# Patient Record
Sex: FEMALE | Race: BLACK OR AFRICAN AMERICAN | NOT HISPANIC OR LATINO | Employment: OTHER | URBAN - NONMETROPOLITAN AREA
[De-identification: names, ages, dates, MRNs, and addresses within clinical notes are randomized per-mention and may not be internally consistent; named-entity substitution may affect disease eponyms.]

---

## 2023-10-06 ENCOUNTER — HOSPITAL ENCOUNTER (EMERGENCY)
Facility: HOSPITAL | Age: 88
Discharge: HOME | End: 2023-10-06
Payer: COMMERCIAL

## 2023-10-06 VITALS
RESPIRATION RATE: 16 BRPM | OXYGEN SATURATION: 96 % | DIASTOLIC BLOOD PRESSURE: 74 MMHG | HEART RATE: 62 BPM | SYSTOLIC BLOOD PRESSURE: 175 MMHG | TEMPERATURE: 99 F | HEIGHT: 59 IN | WEIGHT: 121.81 LBS | BODY MASS INDEX: 24.56 KG/M2

## 2023-10-06 DIAGNOSIS — S00.03XA HEMATOMA OF SCALP, INITIAL ENCOUNTER: ICD-10-CM

## 2023-10-06 DIAGNOSIS — S00.03XA CONTUSION OF SCALP, INITIAL ENCOUNTER: Primary | ICD-10-CM

## 2023-10-06 PROCEDURE — 99284 EMERGENCY DEPT VISIT MOD MDM: CPT | Mod: 25

## 2023-10-07 NOTE — ED PROVIDER NOTES
Encounter Date: 10/6/2023       History     Chief Complaint   Patient presents with    Fall    Head Injury     Pt. Report mechanical fall with hematoma to back of head. Pt. Denies LOC and blood thinner. Denies HA. Reports soreness to head at  hematoma.      89-year-old female presents after trip and fall at home.  She struck the back of her head and suffered a hematoma on her posterior scalp.  She denies any loss of consciousness.  She did not become weak and fall.  She denies any other injury.  Denies any neck pain.  She has suffered no neurologic symptoms or vomiting.  She is in no pain at this time.    The history is provided by the patient and a relative.     Review of patient's allergies indicates:  No Known Allergies  History reviewed. No pertinent past medical history.  No past surgical history on file.  History reviewed. No pertinent family history.     Review of Systems   Constitutional:  Negative for fever.   HENT:  Negative for sore throat.    Respiratory:  Negative for shortness of breath.    Cardiovascular:  Negative for chest pain.   Gastrointestinal:  Negative for nausea.   Genitourinary:  Negative for dysuria.   Musculoskeletal:  Negative for back pain and neck pain.   Skin:  Positive for wound. Negative for rash.   Neurological:  Negative for weakness and headaches.   Hematological:  Does not bruise/bleed easily.   All other systems reviewed and are negative.      Physical Exam     Initial Vitals [10/06/23 1933]   BP Pulse Resp Temp SpO2   (!) 185/68 68 16 98.6 °F (37 °C) 99 %      MAP       --         Physical Exam    Nursing note and vitals reviewed.  Constitutional: Vital signs are normal. She appears well-developed and well-nourished. She is cooperative.   Patient appears well, and is freely conversant   HENT:   Head: Normocephalic.   Mouth/Throat: Oropharynx is clear and moist.   Has a quarter-size hematoma on the back of her left scalp   Eyes: Conjunctivae, EOM and lids are normal. Pupils are  equal, round, and reactive to light.   Neck: Trachea normal. Neck supple.   There is no cervical tenderness.   Normal range of motion.  Cardiovascular:  Normal rate, regular rhythm, normal heart sounds and intact distal pulses.           Pulmonary/Chest: Breath sounds normal.   Abdominal: Abdomen is soft. Bowel sounds are normal.   Musculoskeletal:         General: Normal range of motion.      Cervical back: Normal, normal range of motion and neck supple.      Lumbar back: Normal.     Neurological: She is alert and oriented to person, place, and time. She has normal strength. Coordination normal. GCS score is 15. GCS eye subscore is 4. GCS verbal subscore is 5. GCS motor subscore is 6.   Skin: Skin is warm, dry and intact. Capillary refill takes less than 2 seconds.   Psychiatric: She has a normal mood and affect. Her speech is normal and behavior is normal. Judgment and thought content normal. Cognition and memory are normal.         ED Course   Procedures  Labs Reviewed - No data to display       Imaging Results              CT Head Without Contrast (Preliminary result)  Result time 10/06/23 20:03:18      Preliminary result by Grover Springer MD (10/06/23 20:03:18)                   Narrative:    START OF REPORT:  Technique: CT of the head was performed without intravenous contrast with axial as well as coronal and sagittal images.    Comparison: None.    Dosage Information: Automated exposure control was utilized.    Clinical history: Pt. Report mechanical fall with hematoma to back of head. Pt. Denies LOC and blood thinner. Denies HA. Reports soreness to head at hematoma.    Findings:  Hemorrhage: No acute intracranial hemorrhage is seen.  CSF spaces: The ventricles, sulci and basal cisterns all appear prominent consistent with global cerebral atrophy.  Brain parenchyma: There is preservation of the grey white junction throughout. No acute infarct. A chronic infarct is identified in the right basal ganglia seen  on image 15, series 6.  Cerebellum: Unremarkable.  Vascular: Atheromatous calcification of the intracranial arteries is seen.  Sella and skull base: The sella appears somewhat prominent and CSF filled.  Intracranial calcifications: Incidental note is made of bilateral choroid plexus calcification.  Calvarium: No acute linear or depressed skull fracture is seen.    Maxillofacial Structures:  Paranasal sinuses: The visualized paranasal sinuses appear clear with no significant mucoperiosteal thickening or air fluid levels identified.  Orbits: The orbits appear unremarkable.  Zygomatic arches: The zygomatic arches are intact and unremarkable.  Temporal bones and mastoids: The temporal bones and mastoids appear unremarkable.  TMJ: The mandibular condyles appear normally placed with respect to the mandibular fossa.  Nasal Bones: The nasal septum is midline. No displaced nasal bone fracture is seen.    Visualized upper cervical spine: The visualized cervical spine appears unremarkable.      Impression:  1. No acute intracranial process identified. Details as above.                                         Medications - No data to display  Medical Decision Making  Left occipital scalp contusion/hematoma  Differential diagnosis:  Intracerebral hemorrhage, concussion, scalp contusion/hematoma  CT brain    Amount and/or Complexity of Data Reviewed  Radiology: ordered. Decision-making details documented in ED Course.               ED Course as of 10/06/23 2029   Fri Oct 06, 2023   2027 CT Head Without Contrast  No acute abnormalities, no bleed [TM]      ED Course User Index  [TM] Pj Barclay MD                    Clinical Impression:   Final diagnoses:  [S00.03XA] Contusion of scalp, initial encounter (Primary)  [S00.03XA] Hematoma of scalp, initial encounter        ED Disposition Condition    Discharge Stable          ED Prescriptions    None       Follow-up Information       Follow up With Specialties Details Why  Contact Info    PCP  Call in 3 days               Pj Barclay MD  10/06/23 2029

## 2024-03-04 ENCOUNTER — HOSPITAL ENCOUNTER (EMERGENCY)
Facility: HOSPITAL | Age: 89
Discharge: HOME | End: 2024-03-04
Payer: COMMERCIAL

## 2024-03-04 VITALS
WEIGHT: 130 LBS | HEART RATE: 86 BPM | OXYGEN SATURATION: 98 % | RESPIRATION RATE: 17 BRPM | HEIGHT: 59 IN | TEMPERATURE: 98 F | BODY MASS INDEX: 26.21 KG/M2 | DIASTOLIC BLOOD PRESSURE: 82 MMHG | SYSTOLIC BLOOD PRESSURE: 195 MMHG

## 2024-03-04 DIAGNOSIS — M79.602 ARM PAIN, LATERAL, LEFT: Primary | ICD-10-CM
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
## 2024-03-04 PROCEDURE — 99283 EMERGENCY DEPT VISIT LOW MDM: CPT | Mod: 25

## 2024-03-04 PROCEDURE — 25000003 PHARM REV CODE 250: Performed by: NURSE PRACTITIONER

## 2024-03-04 RX ORDER — ACETAMINOPHEN AND CODEINE PHOSPHATE 300; 30 MG/1; MG/1
1 TABLET ORAL
Status: COMPLETED | OUTPATIENT
Start: 2024-03-04 | End: 2024-03-04

## 2024-03-04 RX ADMIN — ACETAMINOPHEN AND CODEINE PHOSPHATE 1 TABLET: 300; 30 TABLET ORAL at 05:03

## 2024-03-04 NOTE — ED PROVIDER NOTES
"Encounter Date: 3/4/2024       History     Chief Complaint   Patient presents with    Fall     Reports that she got out of the truck and fell onto her left side. Pt reports that her head "felt heavy" and just fell over. Denies hitting head or LOC. Negative for thinners. Having pain in her entire left arm. Denies blurry vision, weakness, or headache.     C/o lt arm pain after falling out of the truck PTA, she denies hitting her head, no loc, she says she was not dizzy and does not know how she fell out of the truck      Review of patient's allergies indicates:  No Known Allergies  Past Medical History:   Diagnosis Date    Arthritis     Diabetes mellitus     Hypertension      Past Surgical History:   Procedure Laterality Date    EYE SURGERY      HYSTERECTOMY      TONSILLECTOMY       No family history on file.  Social History     Tobacco Use    Smoking status: Never    Smokeless tobacco: Never   Substance Use Topics    Alcohol use: Never     Review of Systems   Musculoskeletal:         Lt arm pain   All other systems reviewed and are negative.      Physical Exam     Initial Vitals [03/04/24 1719]   BP Pulse Resp Temp SpO2   (!) 195/82 86 16 98.2 °F (36.8 °C) 98 %      MAP       --         Physical Exam    Nursing note and vitals reviewed.  Constitutional: She appears well-developed and well-nourished.   HENT:   Head: Normocephalic and atraumatic.   Eyes: Pupils are equal, round, and reactive to light.   Neck:   Normal range of motion.  Cardiovascular:  Normal rate, regular rhythm and normal heart sounds.           Pulmonary/Chest: Breath sounds normal.   Musculoskeletal:      Cervical back: Normal range of motion.      Comments: Lt humerus tenderness and limited rom, all other extremities wnl     Neurological: She is alert and oriented to person, place, and time.   Skin: Skin is warm and dry. Capillary refill takes less than 2 seconds.   Psychiatric: She has a normal mood and affect. Her behavior is normal. Judgment " and thought content normal.         ED Course   Procedures  Labs Reviewed - No data to display       Imaging Results              X-Ray Humerus 2 View Left (Final result)  Result time 03/04/24 17:40:32      Final result by Jl Ugarte MD (03/04/24 17:40:32)                   Impression:      No acute finding is identified.      Electronically signed by: Jl Ugarte  Date:    03/04/2024  Time:    17:40               Narrative:    EXAMINATION:  XR HUMERUS 2 VIEW LEFT    CLINICAL HISTORY:  Fall (on) (from) unspecified stairs and steps, sequela    COMPARISON:  None    FINDINGS:  AP and lateral projections of the right humerus show no evidence of an acute displaced fracture, dislocation or radiopaque foreign body.                        Wet Read by Pj Barclay MD (03/04/24 17:36:05, [facility], Emergency Medicine)    No fractures, normal alignment                                     Medications   acetaminophen-codeine 300-30mg per tablet 1 tablet (1 tablet Oral Given 3/4/24 1739)     Medical Decision Making  Problems Addressed:  Arm pain, lateral, left: acute illness or injury  Fall (on) (from) unspecified stairs and steps, sequela: acute illness or injury    Amount and/or Complexity of Data Reviewed  Radiology: ordered and independent interpretation performed.    Risk  Prescription drug management.                                      Clinical Impression:  Final diagnoses:  [W10.9XXS] Fall (on) (from) unspecified stairs and steps, sequela  [M79.602] Arm pain, lateral, left (Primary)          ED Disposition Condition    Discharge Stable          ED Prescriptions    None       Follow-up Information       Follow up With Specialties Details Why Contact Info    Capo Kelly MD Family Medicine Call  As needed [phone]  [address]  [facility] [address]  [phone]               Afia Ayala [facility]  03/04/24 8031

## 2024-03-24 ENCOUNTER — HOSPITAL ENCOUNTER (EMERGENCY)
Facility: HOSPITAL | Age: 89
Discharge: HOME | End: 2024-03-24
Attending: INTERNAL MEDICINE
Payer: COMMERCIAL

## 2024-03-24 VITALS
WEIGHT: 114 LBS | BODY MASS INDEX: 22.98 KG/M2 | DIASTOLIC BLOOD PRESSURE: 91 MMHG | HEART RATE: 87 BPM | RESPIRATION RATE: 18 BRPM | SYSTOLIC BLOOD PRESSURE: 163 MMHG | OXYGEN SATURATION: 100 % | TEMPERATURE: 98 F | HEIGHT: 59 IN

## 2024-03-24 DIAGNOSIS — I10 UNCONTROLLED HYPERTENSION: ICD-10-CM

## 2024-03-24 DIAGNOSIS — R07.9 CHEST PAIN: ICD-10-CM
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
## 2024-03-24 DIAGNOSIS — E83.42 HYPOMAGNESEMIA: ICD-10-CM

## 2024-03-24 DIAGNOSIS — I10 HYPERTENSION, UNSPECIFIED TYPE: Primary | ICD-10-CM

## 2024-03-24 LAB
ALBUMIN SERPL-MCNC: 4.2 G/DL (ref 3.4–5)
ALBUMIN/GLOB SERPL: 1 RATIO
ALP SERPL-CCNC: 69 UNIT/L (ref 50–144)
ALT SERPL-CCNC: 32 UNIT/L (ref 1–45)
AMPHET UR QL SCN: NEGATIVE
ANION GAP SERPL CALC-SCNC: 10 MEQ/L (ref 2–13)
APPEARANCE UR: CLEAR
AST SERPL-CCNC: 62 UNIT/L (ref 14–36)
BACTERIA #/AREA URNS AUTO: ABNORMAL /HPF
BARBITURATE SCN PRESENT UR: NEGATIVE
BASOPHILS # BLD AUTO: 0.02 X10(3)/MCL (ref 0.01–0.08)
BASOPHILS NFR BLD AUTO: 0.2 % (ref 0.1–1.2)
BENZODIAZ UR QL SCN: NEGATIVE
BILIRUB SERPL-MCNC: 1.1 MG/DL (ref 0–1)
BILIRUB UR QL STRIP.AUTO: NEGATIVE
BNP BLD-MCNC: 4440 PG/ML (ref 0–124.9)
BUN SERPL-MCNC: 20 MG/DL (ref 7–20)
CALCIUM SERPL-MCNC: 10.1 MG/DL (ref 8.4–10.2)
CANNABINOIDS UR QL SCN: NEGATIVE
CHLORIDE SERPL-SCNC: 102 MMOL/L (ref 98–110)
CO2 SERPL-SCNC: 25 MMOL/L (ref 21–32)
COCAINE UR QL SCN: NEGATIVE
COLOR UR AUTO: YELLOW
CREAT SERPL-MCNC: 0.6 MG/DL (ref 0.66–1.25)
CREAT/UREA NIT SERPL: 33 (ref 12–20)
EOSINOPHIL # BLD AUTO: 0.03 X10(3)/MCL (ref 0.04–0.36)
EOSINOPHIL NFR BLD AUTO: 0.3 % (ref 0.7–7)
ERYTHROCYTE [DISTWIDTH] IN BLOOD BY AUTOMATED COUNT: 13.6 % (ref 11–14.5)
GFR SERPLBLD CREATININE-BSD FMLA CKD-EPI: 86 MLS/MIN/1.73/M2
GLOBULIN SER-MCNC: 4.2 GM/DL (ref 2–3.9)
GLUCOSE SERPL-MCNC: 200 MG/DL (ref 70–115)
GLUCOSE UR QL STRIP.AUTO: NEGATIVE
HCT VFR BLD AUTO: 36.8 % (ref 36–48)
HGB BLD-MCNC: 12.2 G/DL (ref 11.8–16)
HYALINE CASTS URNS QL MICRO: ABNORMAL /LPF
IMM GRANULOCYTES # BLD AUTO: 0.02 X10(3)/MCL (ref 0–0.03)
IMM GRANULOCYTES NFR BLD AUTO: 0.2 % (ref 0–0.5)
INR PPP: 1
KETONES UR QL STRIP.AUTO: 15
LEUKOCYTE ESTERASE UR QL STRIP.AUTO: NEGATIVE
LYMPHOCYTES # BLD AUTO: 3.33 X10(3)/MCL (ref 1.16–3.74)
LYMPHOCYTES NFR BLD AUTO: 37.7 % (ref 20–55)
MAGNESIUM SERPL-MCNC: 1.7 MG/DL (ref 1.8–2.4)
MCH RBC QN AUTO: 27.7 PG (ref 27–34)
MCHC RBC AUTO-ENTMCNC: 33.2 G/DL (ref 31–37)
MCV RBC AUTO: 83.6 FL (ref 79–99)
METHADONE UR QL SCN: NEGATIVE
MONOCYTES # BLD AUTO: 0.6 X10(3)/MCL (ref 0.24–0.36)
MONOCYTES NFR BLD AUTO: 6.8 % (ref 4.7–12.5)
NEUTROPHILS # BLD AUTO: 4.84 X10(3)/MCL (ref 1.56–6.13)
NEUTROPHILS NFR BLD AUTO: 54.8 % (ref 37–73)
NITRITE UR QL STRIP.AUTO: NEGATIVE
NRBC BLD AUTO-RTO: 0 %
OPIATES UR QL SCN: NEGATIVE
PCP UR QL: NEGATIVE
PH UR STRIP.AUTO: 6 [PH]
PH UR: 6 [PH] (ref 3–11)
PLATELET # BLD AUTO: 280 X10(3)/MCL (ref 140–371)
PMV BLD AUTO: 9.6 FL (ref 9.4–12.4)
POCT GLUCOSE: 192 MG/DL (ref 70–110)
POTASSIUM SERPL-SCNC: 4.2 MMOL/L (ref 3.5–5.1)
PROT SERPL-MCNC: 8.4 GM/DL (ref 6.3–8.2)
PROT UR QL STRIP.AUTO: 100
PROTHROMBIN TIME: 10.6 SECONDS (ref 9.3–11.9)
RBC # BLD AUTO: 4.4 X10(6)/MCL (ref 4–5.1)
RBC #/AREA URNS AUTO: ABNORMAL /HPF
RBC UR QL AUTO: ABNORMAL
SODIUM SERPL-SCNC: 137 MMOL/L (ref 135–145)
SP GR UR STRIP.AUTO: 1.02 (ref 1–1.03)
SQUAMOUS #/AREA URNS AUTO: ABNORMAL /HPF
TROPONIN I SERPL-MCNC: 0.02 NG/ML (ref 0–0.03)
UROBILINOGEN UR STRIP-ACNC: 0.2
WBC # SPEC AUTO: 8.84 X10(3)/MCL (ref 4–11.5)
WBC #/AREA URNS AUTO: ABNORMAL /HPF

## 2024-03-24 PROCEDURE — 84484 ASSAY OF TROPONIN QUANT: CPT | Performed by: INTERNAL MEDICINE

## 2024-03-24 PROCEDURE — 80053 COMPREHEN METABOLIC PANEL: CPT | Performed by: INTERNAL MEDICINE

## 2024-03-24 PROCEDURE — 96375 TX/PRO/DX INJ NEW DRUG ADDON: CPT

## 2024-03-24 PROCEDURE — 93010 ELECTROCARDIOGRAM REPORT: CPT | Mod: ,,, | Performed by: INTERNAL MEDICINE

## 2024-03-24 PROCEDURE — 81003 URINALYSIS AUTO W/O SCOPE: CPT | Performed by: INTERNAL MEDICINE

## 2024-03-24 PROCEDURE — 80307 DRUG TEST PRSMV CHEM ANLYZR: CPT | Performed by: INTERNAL MEDICINE

## 2024-03-24 PROCEDURE — 63600175 PHARM REV CODE 636 W HCPCS: Performed by: INTERNAL MEDICINE

## 2024-03-24 PROCEDURE — 96374 THER/PROPH/DIAG INJ IV PUSH: CPT

## 2024-03-24 PROCEDURE — 83735 ASSAY OF MAGNESIUM: CPT | Performed by: INTERNAL MEDICINE

## 2024-03-24 PROCEDURE — 25000003 PHARM REV CODE 250: Performed by: INTERNAL MEDICINE

## 2024-03-24 PROCEDURE — 96361 HYDRATE IV INFUSION ADD-ON: CPT

## 2024-03-24 PROCEDURE — 85025 COMPLETE CBC W/AUTO DIFF WBC: CPT | Performed by: INTERNAL MEDICINE

## 2024-03-24 PROCEDURE — 99285 EMERGENCY DEPT VISIT HI MDM: CPT | Mod: 25

## 2024-03-24 PROCEDURE — 82962 GLUCOSE BLOOD TEST: CPT

## 2024-03-24 PROCEDURE — 85610 PROTHROMBIN TIME: CPT | Performed by: INTERNAL MEDICINE

## 2024-03-24 PROCEDURE — 83880 ASSAY OF NATRIURETIC PEPTIDE: CPT | Performed by: INTERNAL MEDICINE

## 2024-03-24 PROCEDURE — 93005 ELECTROCARDIOGRAM TRACING: CPT

## 2024-03-24 RX ORDER — CARVEDILOL 6.25 MG/1
6.25 TABLET ORAL 2 TIMES DAILY
COMMUNITY
Start: 2023-10-10

## 2024-03-24 RX ORDER — FUROSEMIDE 10 MG/ML
20 INJECTION INTRAMUSCULAR; INTRAVENOUS
Status: COMPLETED | OUTPATIENT
Start: 2024-03-24 | End: 2024-03-24

## 2024-03-24 RX ORDER — TRAMADOL HYDROCHLORIDE 50 MG/1
50 TABLET ORAL
COMMUNITY
Start: 2024-02-12

## 2024-03-24 RX ORDER — PANTOPRAZOLE SODIUM 40 MG/1
40 TABLET, DELAYED RELEASE ORAL DAILY
COMMUNITY
Start: 2024-02-10

## 2024-03-24 RX ORDER — METFORMIN HYDROCHLORIDE 500 MG/1
500 TABLET, EXTENDED RELEASE ORAL 2 TIMES DAILY
COMMUNITY
Start: 2024-03-20

## 2024-03-24 RX ORDER — HYDRALAZINE HYDROCHLORIDE 20 MG/ML
10 INJECTION INTRAMUSCULAR; INTRAVENOUS
Status: COMPLETED | OUTPATIENT
Start: 2024-03-24 | End: 2024-03-24

## 2024-03-24 RX ORDER — ESCITALOPRAM OXALATE 10 MG/1
10 TABLET ORAL DAILY
COMMUNITY
Start: 2024-02-10

## 2024-03-24 RX ORDER — LANOLIN ALCOHOL/MO/W.PET/CERES
400 CREAM (GRAM) TOPICAL DAILY
Qty: 5 TABLET | Refills: 0 | Status: SHIPPED | OUTPATIENT
Start: 2024-03-24 | End: 2024-03-29

## 2024-03-24 RX ORDER — ALENDRONATE SODIUM 70 MG/1
70 TABLET ORAL
COMMUNITY
Start: 2024-03-14

## 2024-03-24 RX ORDER — LANOLIN ALCOHOL/MO/W.PET/CERES
400 CREAM (GRAM) TOPICAL ONCE
Status: COMPLETED | OUTPATIENT
Start: 2024-03-24 | End: 2024-03-24

## 2024-03-24 RX ORDER — HYDROCHLOROTHIAZIDE 25 MG/1
25 TABLET ORAL DAILY
COMMUNITY
Start: 2024-02-10

## 2024-03-24 RX ADMIN — SODIUM CHLORIDE 1000 ML: 9 INJECTION, SOLUTION INTRAVENOUS at 03:03

## 2024-03-24 RX ADMIN — HYDRALAZINE HYDROCHLORIDE 10 MG: 20 INJECTION INTRAMUSCULAR; INTRAVENOUS at 04:03

## 2024-03-24 RX ADMIN — Medication 400 MG: at 05:03

## 2024-03-24 RX ADMIN — FUROSEMIDE 20 MG: 10 INJECTION, SOLUTION INTRAMUSCULAR; INTRAVENOUS at 05:03

## 2024-03-24 NOTE — ED PROVIDER NOTES
Encounter Date: 3/24/2024       History     Chief Complaint   Patient presents with    Fall    Weakness     Pt presented to ED per EMS with c/o generalized weakness and Fall. Daughter reports she found mother on floor at home. Pt denies blood thinners. Daughter reports poor appetite and increased weakness.      This is a 89-year-old female patient brought into the emergency room by EMS with history of having fall today.  The patient's daughter found her on the floor.  Patient denies having any loss of consciousness, vomiting, seizures.  Patient is a known diabetic patient in the apparently is not compliant with her medications.  Denies any chest pain shortness of breath or palpitations.  Reports having headache and low back pain.  Denies any abdominal pain, dysuria.  As per the EMS her CBG was 222 mg/dL.  Patient apparently also has mild confusion today.        Review of patient's allergies indicates:  No Known Allergies  Past Medical History:   Diagnosis Date    Arthritis     Diabetes mellitus     Hypertension      Past Surgical History:   Procedure Laterality Date    EYE SURGERY      HYSTERECTOMY      TONSILLECTOMY       History reviewed. No pertinent family history.  Social History     Tobacco Use    Smoking status: Never    Smokeless tobacco: Never   Substance Use Topics    Alcohol use: Never    Drug use: Never     Review of Systems   Constitutional:  Positive for fatigue.   HENT: Negative.     Eyes: Negative.    Respiratory: Negative.  Negative for cough, chest tightness and shortness of breath.    Cardiovascular:  Negative for chest pain and palpitations.   Gastrointestinal:  Negative for abdominal pain, diarrhea, nausea and vomiting.   Genitourinary:  Negative for dysuria.   Musculoskeletal:  Positive for back pain and myalgias.   Skin: Negative.    Neurological:  Positive for headaches. Negative for dizziness, seizures, syncope and facial asymmetry.   All other systems reviewed and are  negative.      Physical Exam     Initial Vitals [03/24/24 1520]   BP Pulse Resp Temp SpO2   (!) 149/88 82 20 97.8 °F (36.6 °C) 97 %      MAP       --         Physical Exam    Nursing note and vitals reviewed.  Constitutional: She appears well-developed and well-nourished.   HENT:   Head: Normocephalic and atraumatic.   Eyes: Conjunctivae and EOM are normal.   Neck: Neck supple.   Normal range of motion.  Cardiovascular:  Normal rate, regular rhythm, normal heart sounds and intact distal pulses.           Pulmonary/Chest: Breath sounds normal.   Abdominal: Abdomen is soft. Bowel sounds are normal.   Musculoskeletal:         General: Normal range of motion.      Cervical back: Normal range of motion and neck supple.     Neurological: She is alert and oriented to person, place, and time. GCS score is 15. GCS eye subscore is 4. GCS verbal subscore is 5. GCS motor subscore is 6.   Skin: Skin is warm and dry. Capillary refill takes less than 2 seconds.         ED Course   Procedures  Labs Reviewed   COMPREHENSIVE METABOLIC PANEL - Abnormal; Notable for the following components:       Result Value    Glucose Level 200 (*)     Creatinine 0.60 (*)     Protein Total 8.4 (*)     Globulin 4.2 (*)     Bilirubin Total 1.1 (*)     Aspartate Aminotransferase 62 (*)     BUN/Creatinine Ratio 33 (*)     All other components within normal limits   NT-PRO NATRIURETIC PEPTIDE - Abnormal; Notable for the following components:    ProBNP 4,440.0 (*)     All other components within normal limits   MAGNESIUM - Abnormal; Notable for the following components:    Magnesium Level 1.70 (*)     All other components within normal limits   CBC WITH DIFFERENTIAL - Abnormal; Notable for the following components:    Eos % 0.3 (*)     Mono # 0.60 (*)     Eos # 0.03 (*)     All other components within normal limits   URINALYSIS - Abnormal; Notable for the following components:    Protein,  (*)     Ketones, UA 15 (*)     Blood, UA Trace-Intact (*)      All other components within normal limits    Narrative:      URINE STABILITY IS 2 HOURS AT ROOM TEMP OR    SIX HOURS REFRIGERATED. PERFORMING TESTING ON    SPECIMENS GREATER THAN THIS AGE MAY AFFECT THE    FOLLOWING TESTS:    PH          SPECIFIC GRAVITY           BLOOD    CLARITY     BILIRUBIN               UROBILINOGEN   URINALYSIS, MICROSCOPIC - Abnormal; Notable for the following components:    Hyaline Casts, UA Rare (*)     All other components within normal limits   POCT GLUCOSE - Abnormal; Notable for the following components:    POCT Glucose 192 (*)     All other components within normal limits   TROPONIN I - Normal   PROTIME-INR - Normal    Narrative:     Protimes are used to monitor anticoagulant agents such as warfarin. PT INR values are based on the current patient normal mean and the ZEFERINO value for the specific instrument reagent used.  **Routine theraputic target values for the INR are 2.0-3.0**   DRUG SCREEN, URINE (BEAKER) - Normal    Narrative:     Cut off concentrations:    Amphetamines - 1000 ng/ml  Barbiturates - 200 ng/ml  Benzodiazepine - 200 ng/ml  Cannabinoids (THC) - 50 ng/ml  Cocaine - 300 ng/ml  Fentanyl - 1.0 ng/ml  MDMA - 500 ng/ml  Opiates - 300 ng/ml   Phencyclidine (PCP) - 25 ng/ml  Methadone - 300 ng/ml      False negatives may result form substances such as bleach added to urine.  False positives may result for the presence of a substance with similar chemical structure to the drug or its metabolite.    This test provides only a PRELIMINARY analytical test result. A more specific alternate chemical method must be used in order to obtain a confirmed analytical result. Gas chromatography/mass spectrometry (GC/MS) is the preferred confirmatory method. Other chemical confirmation methods are available. Clinical consideration and professional judgement should be applied to any drug of abuse test result, particularly when preliminary positive results are used.    Positive results will  be confirmed only at the physicians request. Unconfirmed screening results are to be used only for medical purposes (treatment).          CBC W/ AUTO DIFFERENTIAL    Narrative:     The following orders were created for panel order CBC auto differential.  Procedure                               Abnormality         Status                     ---------                               -----------         ------                     CBC with Differential[[id number]]       Abnormal            Final result                 Please view results for these tests on the individual orders.     EKG Readings: (Independently Interpreted)   EKG shows normal sinus rhythm with heart rate of 82, PA interval of 190, QRS duration of 142, QTC 16 milliseconds.  No ST depressions or ST elevations.    Has left bundle-branch block.       Imaging Results              CT Lumbar Spine Without Contrast (Final result)  Result time 03/24/24 16:42:04      Final result by Rik Xiao MD (03/24/24 16:42:04)                   Impression:      Diffuse spondylotic changes lumbar spine without evidence for fracture.  Greatest affected levels L4-5      Electronically signed by: Rik Xiao MD  Date:    03/24/2024  Time:    16:42               Narrative:    EXAMINATION:  CT LUMBAR SPINE WITHOUT CONTRAST    CLINICAL HISTORY:  fall, back pain;    TECHNIQUE:  Low-dose axial, sagittal and coronal reformations are obtained through the lumbar spine.  Contrast was not administered.  An automated dose exposure technique was utilized this limits radiation does the patient.    COMPARISON:  None.    FINDINGS:  Five lumbar type vertebral bodies.  The alignment demonstrates grade 1 anterolisthesis of L4 on L5 without evidence for spondylolysis nor pars defect.  Normal lordotic curvature.  The vertebral heights are maintained with diffuse osteopenia.  Nodes are compression deformity, fracture, or subluxation.  Areas of intervertebral disc space narrowing is identified.   Disc desiccation is noted.  Severe central canal stenosis is identified at L4-5 other areas of central canal stenosis are identified also at L3-L4.  Areas of neural foraminal narrowing are noted.    The visualized hollow and solid viscera are grossly normal with diffuse atheromatous disease of the aorta.  Lung bases are clear.                                       CT Cervical Spine Without Contrast (Final result)  Result time 03/24/24 16:33:03      Final result by Rik Xiao MD (03/24/24 16:33:03)                   Impression:      Spondylotic changes of cervical spine without evidence for fracture.      Electronically signed by: Rik Xiao MD  Date:    03/24/2024  Time:    16:33               Narrative:    EXAMINATION:  CT CERVICAL SPINE WITHOUT CONTRAST    CLINICAL HISTORY:  fall;    TECHNIQUE:  Low dose axial images, sagittal and coronal reformations were performed though the cervical spine.  Contrast was not administered.  An automated dose exposure technique was utilized this limits radiation does the patient.    COMPARISON:  None    FINDINGS:  The alignment curvature of the cervical spine is normal.  The vertebral heights are maintained with disc desiccation intervertebral disc space narrowing.  No evidence for compression deformity, fracture, or subluxation.  Osteopenia is identified.  No evidence for central canal stenosis.  Multilevel uncovertebral facet arthropathy identified with neural foraminal narrowing.    Soft tissues of neck are grossly normal.  Atheromatous disease of the carotid bulbs are noted.  Poor dentition is noted.                                       CT Head Without Contrast (Final result)  Result time 03/24/24 16:29:52      Final result by Rik Xiao MD (03/24/24 16:29:52)                   Impression:      No acute abnormality.  Changes of microangiopathy and age-appropriate volume loss.      Electronically signed by: Rik Xiao MD  Date:    03/24/2024  Time:    16:29                Narrative:    EXAMINATION:  CT HEAD WITHOUT CONTRAST    CLINICAL HISTORY:  fall;    TECHNIQUE:  Low dose axial CT images obtained throughout the head without intravenous contrast. Sagittal and coronal reconstructions were performed.  An automated dose exposure technique was utilized this limits radiation does the patient.    COMPARISON:  Six hundred twenty-three    FINDINGS:  Intracranial compartment:    Ventricles and sulci are normal in size for age without evidence of hydrocephalus. No extra-axial blood or fluid collections.    The brain parenchyma appears normal. No parenchymal mass, hemorrhage, edema or major vascular distribution infarct.    Skull/extracranial contents (limited evaluation): No fracture. Mastoid air cells and paranasal sinuses are essentially clear.                                    X-Rays:   Independently Interpreted Readings:   Other Readings:  CT lumbar spine shows: Diffuse spondylotic changes lumbar spine without evidence for fracture.  Greatest affected levels L4-5     CT head shows no acute intracranial findings    CT cervical spine shows no acute fractures    Medications   sodium chloride 0.9% bolus 1,000 mL 1,000 mL (1,000 mLs Intravenous New Bag 3/24/24 1546)   hydrALAZINE injection 10 mg (10 mg Intravenous Given 3/24/24 1632)   furosemide injection 20 mg (20 mg Intravenous Given 3/24/24 1701)   magnesium oxide tablet 400 mg (400 mg Oral Given 3/24/24 1701)     Medical Decision Making  This is a 89-year-old female patient brought into the emergency room by EMS with history of having fall today.  The patient's daughter found her on the floor.  Patient denies having any loss of consciousness, vomiting, seizures.  Patient is a known diabetic patient in the apparently is not compliant with her medications.  Denies any chest pain shortness of breath or palpitations.  Reports having headache and low back pain.  Denies any abdominal pain, dysuria.  As per the EMS her CBG was 222 mg/dL.   Patient apparently also has mild confusion today.    CT lumbar spine shows: Diffuse spondylotic changes lumbar spine without evidence for fracture.  Greatest affected levels L4-5     CT head shows no acute intracranial findings    CT cervical spine shows no acute fractures    Patient has hypomagnesemia.  Given magnesium oxide.  Elevated BNP.  Given Lasix.  Patient has been given hydralazine for elevated blood pressure.    We will discharge the patient home with recommendation to follow up with primary medical doctor.      Amount and/or Complexity of Data Reviewed  Labs: ordered.  Radiology: ordered.    Risk  OTC drugs.  Prescription drug management.                                      Clinical Impression:  Final diagnoses:  [W19.XXXA] Fall  [R07.9] Chest pain  [I10] Hypertension, unspecified type (Primary)  [E83.42] Hypomagnesemia  [I10] Uncontrolled hypertension          ED Disposition Condition    Discharge Stable          ED Prescriptions       Medication Sig Dispense Start Date End Date Auth. Provider    magnesium oxide (MAG-OX) 400 mg (241.3 mg magnesium) tablet Take 1 tablet (400 mg total) by mouth once daily. for 5 days 5 tablet 3/24/2024 3/29/2024 Jg Wick DO          Follow-up Information       Follow up With Specialties Details Why Contact Info    Primary medical doctor                 Jg Wick DO  03/24/24 0347

## 2024-03-25 LAB
OHS QRS DURATION: 142 MS
OHS QTC CALCULATION: 516 MS

## 2024-07-17 ENCOUNTER — HOSPITAL ENCOUNTER (INPATIENT)
Facility: HOSPITAL | Age: 89
LOS: 5 days | Discharge: HOME HEALTH | DRG: 159 | End: 2024-07-22
Attending: FAMILY MEDICINE | Admitting: INTERNAL MEDICINE
Payer: COMMERCIAL

## 2024-07-17 ENCOUNTER — E-CONSULT (OUTPATIENT)
Dept: INFECTIOUS DISEASES | Facility: HOSPITAL | Age: 89
End: 2024-07-17
Payer: COMMERCIAL

## 2024-07-17 DIAGNOSIS — M27.2 OSTEOMYELITIS, JAW ACUTE: Primary | ICD-10-CM

## 2024-07-17 DIAGNOSIS — M27.2 OSTEOMYELITIS OF JAW: Primary | ICD-10-CM

## 2024-07-17 PROBLEM — M86.9 OSTEOMYELITIS: Status: ACTIVE | Noted: 2024-07-17

## 2024-07-17 LAB
ALBUMIN SERPL-MCNC: 4.1 G/DL (ref 3.4–5)
ALBUMIN/GLOB SERPL: 1 RATIO
ALP SERPL-CCNC: 81 UNIT/L (ref 50–144)
ALT SERPL-CCNC: 13 UNIT/L (ref 1–45)
ANION GAP SERPL CALC-SCNC: 9 MEQ/L (ref 2–13)
AST SERPL-CCNC: 24 UNIT/L (ref 14–36)
BASOPHILS # BLD AUTO: 0.04 X10(3)/MCL (ref 0.01–0.08)
BASOPHILS NFR BLD AUTO: 0.6 % (ref 0.1–1.2)
BILIRUB SERPL-MCNC: 0.5 MG/DL (ref 0–1)
BUN SERPL-MCNC: 27 MG/DL (ref 7–20)
CALCIUM SERPL-MCNC: 10.3 MG/DL (ref 8.4–10.2)
CHLORIDE SERPL-SCNC: 101 MMOL/L (ref 98–110)
CO2 SERPL-SCNC: 26 MMOL/L (ref 21–32)
CREAT SERPL-MCNC: 0.84 MG/DL (ref 0.66–1.25)
CREAT/UREA NIT SERPL: 32 (ref 12–20)
CRP SERPL-MCNC: 1.7 MG/DL
EOSINOPHIL # BLD AUTO: 0.05 X10(3)/MCL (ref 0.04–0.36)
EOSINOPHIL NFR BLD AUTO: 0.8 % (ref 0.7–7)
ERYTHROCYTE [DISTWIDTH] IN BLOOD BY AUTOMATED COUNT: 15.3 % (ref 11–14.5)
ERYTHROCYTE [SEDIMENTATION RATE] IN BLOOD: 101 MM/HR (ref 0–20)
GFR SERPLBLD CREATININE-BSD FMLA CKD-EPI: 66 ML/MIN/1.73/M2
GLOBULIN SER-MCNC: 4.3 GM/DL (ref 2–3.9)
GLUCOSE SERPL-MCNC: 231 MG/DL (ref 70–115)
HCT VFR BLD AUTO: 32.6 % (ref 36–48)
HGB BLD-MCNC: 10.5 G/DL (ref 11.8–16)
IMM GRANULOCYTES # BLD AUTO: 0.01 X10(3)/MCL (ref 0–0.03)
IMM GRANULOCYTES NFR BLD AUTO: 0.2 % (ref 0–0.5)
LYMPHOCYTES # BLD AUTO: 3.55 X10(3)/MCL (ref 1.16–3.74)
LYMPHOCYTES NFR BLD AUTO: 55.9 % (ref 20–55)
MCH RBC QN AUTO: 26 PG (ref 27–34)
MCHC RBC AUTO-ENTMCNC: 32.2 G/DL (ref 31–37)
MCV RBC AUTO: 80.7 FL (ref 79–99)
MONOCYTES # BLD AUTO: 0.41 X10(3)/MCL (ref 0.24–0.36)
MONOCYTES NFR BLD AUTO: 6.5 % (ref 4.7–12.5)
NEUTROPHILS # BLD AUTO: 2.29 X10(3)/MCL (ref 1.56–6.13)
NEUTROPHILS NFR BLD AUTO: 36 % (ref 37–73)
NRBC BLD AUTO-RTO: 0 %
PLATELET # BLD AUTO: 272 X10(3)/MCL (ref 140–371)
PMV BLD AUTO: 9.4 FL (ref 9.4–12.4)
POTASSIUM SERPL-SCNC: 4.3 MMOL/L (ref 3.5–5.1)
PROT SERPL-MCNC: 8.4 GM/DL (ref 6.3–8.2)
RBC # BLD AUTO: 4.04 X10(6)/MCL (ref 4–5.1)
SODIUM SERPL-SCNC: 136 MMOL/L (ref 136–145)
WBC # BLD AUTO: 6.35 X10(3)/MCL (ref 4–11.5)

## 2024-07-17 PROCEDURE — 80053 COMPREHEN METABOLIC PANEL: CPT | Performed by: FAMILY MEDICINE

## 2024-07-17 PROCEDURE — 94761 N-INVAS EAR/PLS OXIMETRY MLT: CPT

## 2024-07-17 PROCEDURE — 86140 C-REACTIVE PROTEIN: CPT | Performed by: INTERNAL MEDICINE

## 2024-07-17 PROCEDURE — 85652 RBC SED RATE AUTOMATED: CPT | Performed by: INTERNAL MEDICINE

## 2024-07-17 PROCEDURE — 85025 COMPLETE CBC W/AUTO DIFF WBC: CPT | Performed by: FAMILY MEDICINE

## 2024-07-17 PROCEDURE — 63600175 PHARM REV CODE 636 W HCPCS: Performed by: FAMILY MEDICINE

## 2024-07-17 PROCEDURE — 99446 NTRPROF PH1/NTRNET/EHR 5-10: CPT | Mod: ,,, | Performed by: STUDENT IN AN ORGANIZED HEALTH CARE EDUCATION/TRAINING PROGRAM

## 2024-07-17 PROCEDURE — 11000001 HC ACUTE MED/SURG PRIVATE ROOM

## 2024-07-17 PROCEDURE — 99900035 HC TECH TIME PER 15 MIN (STAT)

## 2024-07-17 PROCEDURE — 25000003 PHARM REV CODE 250: Performed by: FAMILY MEDICINE

## 2024-07-17 PROCEDURE — 25000003 PHARM REV CODE 250: Performed by: INTERNAL MEDICINE

## 2024-07-17 PROCEDURE — 96365 THER/PROPH/DIAG IV INF INIT: CPT

## 2024-07-17 PROCEDURE — 99285 EMERGENCY DEPT VISIT HI MDM: CPT | Mod: 25

## 2024-07-17 PROCEDURE — 63600175 PHARM REV CODE 636 W HCPCS: Mod: JZ,JG | Performed by: INTERNAL MEDICINE

## 2024-07-17 RX ORDER — ESCITALOPRAM OXALATE 10 MG/1
10 TABLET ORAL DAILY
Status: DISCONTINUED | OUTPATIENT
Start: 2024-07-18 | End: 2024-07-22 | Stop reason: HOSPADM

## 2024-07-17 RX ORDER — IBUPROFEN 200 MG
24 TABLET ORAL
Status: DISCONTINUED | OUTPATIENT
Start: 2024-07-17 | End: 2024-07-22 | Stop reason: HOSPADM

## 2024-07-17 RX ORDER — HEPARIN SODIUM 5000 [USP'U]/ML
5000 INJECTION, SOLUTION INTRAVENOUS; SUBCUTANEOUS EVERY 8 HOURS
Status: DISCONTINUED | OUTPATIENT
Start: 2024-07-17 | End: 2024-07-18

## 2024-07-17 RX ORDER — SODIUM CHLORIDE 0.9 % (FLUSH) 0.9 %
10 SYRINGE (ML) INJECTION
Status: DISCONTINUED | OUTPATIENT
Start: 2024-07-17 | End: 2024-07-22 | Stop reason: HOSPADM

## 2024-07-17 RX ORDER — METRONIDAZOLE 500 MG/100ML
500 INJECTION, SOLUTION INTRAVENOUS
Status: DISCONTINUED | OUTPATIENT
Start: 2024-07-17 | End: 2024-07-18

## 2024-07-17 RX ORDER — NAPROXEN SODIUM 220 MG/1
81 TABLET, FILM COATED ORAL DAILY
Status: DISCONTINUED | OUTPATIENT
Start: 2024-07-18 | End: 2024-07-22 | Stop reason: HOSPADM

## 2024-07-17 RX ORDER — TALC
6 POWDER (GRAM) TOPICAL NIGHTLY PRN
Status: DISCONTINUED | OUTPATIENT
Start: 2024-07-17 | End: 2024-07-22 | Stop reason: HOSPADM

## 2024-07-17 RX ORDER — IBUPROFEN 200 MG
16 TABLET ORAL
Status: DISCONTINUED | OUTPATIENT
Start: 2024-07-17 | End: 2024-07-22 | Stop reason: HOSPADM

## 2024-07-17 RX ORDER — LOVASTATIN 20 MG/1
20 TABLET ORAL NIGHTLY
Status: ON HOLD | COMMUNITY

## 2024-07-17 RX ORDER — DOXYCYCLINE 100 MG/1
100 CAPSULE ORAL EVERY 12 HOURS
Status: ON HOLD | COMMUNITY
Start: 2024-07-15 | End: 2024-07-22 | Stop reason: HOSPADM

## 2024-07-17 RX ORDER — GLUCAGON 1 MG
1 KIT INJECTION
Status: DISCONTINUED | OUTPATIENT
Start: 2024-07-17 | End: 2024-07-22 | Stop reason: HOSPADM

## 2024-07-17 RX ORDER — CARVEDILOL 6.25 MG/1
6.25 TABLET ORAL 2 TIMES DAILY
Status: DISCONTINUED | OUTPATIENT
Start: 2024-07-17 | End: 2024-07-22 | Stop reason: HOSPADM

## 2024-07-17 RX ORDER — ONDANSETRON HYDROCHLORIDE 2 MG/ML
4 INJECTION, SOLUTION INTRAVENOUS EVERY 8 HOURS PRN
Status: DISCONTINUED | OUTPATIENT
Start: 2024-07-17 | End: 2024-07-22 | Stop reason: HOSPADM

## 2024-07-17 RX ORDER — ATORVASTATIN CALCIUM 20 MG/1
20 TABLET, FILM COATED ORAL NIGHTLY
Status: DISCONTINUED | OUTPATIENT
Start: 2024-07-17 | End: 2024-07-22 | Stop reason: HOSPADM

## 2024-07-17 RX ORDER — DAPAGLIFLOZIN 5 MG/1
10 TABLET, FILM COATED ORAL DAILY
Status: ON HOLD | COMMUNITY

## 2024-07-17 RX ORDER — INSULIN ASPART 100 [IU]/ML
0-10 INJECTION, SOLUTION INTRAVENOUS; SUBCUTANEOUS
Status: DISCONTINUED | OUTPATIENT
Start: 2024-07-17 | End: 2024-07-22 | Stop reason: HOSPADM

## 2024-07-17 RX ORDER — ACETAMINOPHEN 325 MG/1
650 TABLET ORAL EVERY 8 HOURS PRN
Status: DISCONTINUED | OUTPATIENT
Start: 2024-07-17 | End: 2024-07-22 | Stop reason: HOSPADM

## 2024-07-17 RX ORDER — AMOXICILLIN 875 MG/1
875 TABLET, FILM COATED ORAL 2 TIMES DAILY
Status: ON HOLD | COMMUNITY
Start: 2024-07-15 | End: 2024-07-22 | Stop reason: HOSPADM

## 2024-07-17 RX ADMIN — CARVEDILOL 6.25 MG: 6.25 TABLET, FILM COATED ORAL at 08:07

## 2024-07-17 RX ADMIN — ATORVASTATIN CALCIUM 20 MG: 20 TABLET, FILM COATED ORAL at 08:07

## 2024-07-17 RX ADMIN — METRONIDAZOLE 500 MG: 500 INJECTION, SOLUTION INTRAVENOUS at 07:07

## 2024-07-17 RX ADMIN — INSULIN ASPART 4 UNITS: 100 INJECTION, SOLUTION INTRAVENOUS; SUBCUTANEOUS at 08:07

## 2024-07-17 RX ADMIN — CEFTRIAXONE SODIUM 1 G: 1 INJECTION, POWDER, FOR SOLUTION INTRAMUSCULAR; INTRAVENOUS at 07:07

## 2024-07-17 RX ADMIN — PIPERACILLIN AND TAZOBACTAM 4.5 G: 4; .5 INJECTION, POWDER, FOR SOLUTION INTRAVENOUS; PARENTERAL at 04:07

## 2024-07-17 RX ADMIN — VANCOMYCIN HYDROCHLORIDE 1000 MG: 1 INJECTION, POWDER, LYOPHILIZED, FOR SOLUTION INTRAVENOUS at 07:07

## 2024-07-17 RX ADMIN — HEPARIN SODIUM 5000 UNITS: 5000 INJECTION, SOLUTION INTRAVENOUS; SUBCUTANEOUS at 09:07

## 2024-07-17 NOTE — ED PROVIDER NOTES
Encounter Date: 7/17/2024       History     Chief Complaint   Patient presents with    Facial Swelling     Pt brought to triage via w/c with c/o swelling and redness to rt jaw line.  Onset last week.     Patient presents for evaluation of jaw swelling and pain with redness.  Patient had outpatient CT of face suspicious in the last to mellitus.  Patient notes having periodic episodes of right jaw swelling and pain.  Patient describes the pain as aching moderate pain that is episodic.  Patient denies having any fevers chills or any other associated symptoms.  Patient denies having any aggravating or relieving features at present.    The history is provided by the patient.     Review of patient's allergies indicates:  No Known Allergies  Past Medical History:   Diagnosis Date    Arthritis     Diabetes mellitus     Hypertension      Past Surgical History:   Procedure Laterality Date    EYE SURGERY      HYSTERECTOMY      TONSILLECTOMY       No family history on file.  Social History     Tobacco Use    Smoking status: Never    Smokeless tobacco: Never   Substance Use Topics    Alcohol use: Never    Drug use: Never     Review of Systems   Constitutional: Negative.    HENT: Negative.     Eyes: Negative.    Respiratory: Negative.     Cardiovascular: Negative.    Gastrointestinal: Negative.    Endocrine: Negative.    Musculoskeletal: Negative.    Skin:  Positive for rash.        Swelling rash right mandibular area.   Allergic/Immunologic: Negative.    Neurological: Negative.    Hematological: Negative.    Psychiatric/Behavioral: Negative.         Physical Exam     Initial Vitals [07/17/24 1433]   BP Pulse Resp Temp SpO2   (!) 123/52 71 20 97.7 °F (36.5 °C) 97 %      MAP       --         Physical Exam    Vitals reviewed.  Constitutional: She appears well-developed and well-nourished. She is not diaphoretic. No distress.   HENT:   Head: Normocephalic and atraumatic.   Mouth/Throat: No oropharyngeal exudate.   Eyes: Conjunctivae  and EOM are normal. Pupils are equal, round, and reactive to light. Right eye exhibits no discharge. Left eye exhibits no discharge.   Neck: Neck supple. No thyromegaly present. No tracheal deviation present. No JVD present.   Normal range of motion.  Cardiovascular:  Normal rate, regular rhythm and normal heart sounds.     Exam reveals no gallop and no friction rub.       No murmur heard.  Pulmonary/Chest: Breath sounds normal. No stridor. No respiratory distress. She has no wheezes. She has no rhonchi. She has no rales.   Abdominal: Abdomen is soft. Bowel sounds are normal. She exhibits no distension. There is no abdominal tenderness. There is no rebound.   Musculoskeletal:         General: No tenderness or edema. Normal range of motion.      Cervical back: Normal range of motion and neck supple.     Neurological: She is alert and oriented to person, place, and time. She has normal reflexes. She displays normal reflexes. No cranial nerve deficit or sensory deficit. GCS score is 15. GCS eye subscore is 4. GCS verbal subscore is 5. GCS motor subscore is 6.   Skin: Skin is warm.   Right-sided facial swelling mandibular area with erythema.  Proximally 3 cm area of swelling   Psychiatric: She has a normal mood and affect.         ED Course   Procedures  Labs Reviewed   COMPREHENSIVE METABOLIC PANEL - Abnormal; Notable for the following components:       Result Value    Glucose 231 (*)     Blood Urea Nitrogen 27 (*)     Calcium 10.3 (*)     Protein Total 8.4 (*)     Globulin 4.3 (*)     BUN/Creatinine Ratio 32 (*)     All other components within normal limits   CBC WITH DIFFERENTIAL - Abnormal; Notable for the following components:    Hgb 10.5 (*)     Hct 32.6 (*)     MCH 26.0 (*)     RDW 15.3 (*)     Neut % 36.0 (*)     Lymph % 55.9 (*)     Mono # 0.41 (*)     All other components within normal limits   CBC W/ AUTO DIFFERENTIAL    Narrative:     The following orders were created for panel order CBC auto  differential.  Procedure                               Abnormality         Status                     ---------                               -----------         ------                     CBC with Differential[[id number]]       Abnormal            Final result                 Please view results for these tests on the individual orders.          Imaging Results    None          Medications   piperacillin-tazobactam (ZOSYN) 4.5 g in D5W 100 mL IVPB (MB+) (0 g Intravenous Stopped 7/17/24 1648)     Medical Decision Making  Amount and/or Complexity of Data Reviewed  Labs: ordered.    Risk  Decision regarding hospitalization.                           Yeah I would recommend setting up for PICC and IV antibiotics. Would use vancomycin, ceftriaxone, and metronidazole. Metronidazole can be PO.  Discussed with infectious Disease Dr. Oden recommends above regimen for 6 weeks.  We will admit patient for PICC line placed him and then outpatient management of antibiotic infusion.       Patient accepted by Dr. Salvador if he needs to talk to me at 5:00 p.m..    Clinical Impression:  Final diagnoses:  [M27.2] Osteomyelitis, jaw acute (Primary)          ED Disposition Condition    Admit Stable                Adryan Mckeon MD  07/17/24 1654       Adryan Mckeon MD  07/17/24 1655       Adryan Mckeon MD  07/17/24 1657       Adryan Mckeon MD  07/17/24 1700       Adryan Mckeon MD  07/17/24 1701

## 2024-07-17 NOTE — CONSULTS
"Infectious Diseases  Response for E-Consult     Patient Name: Augusta James  MRN: [id number]  Primary Care Provider: Capo Kelly MD   Requesting Provider: Adryan Mckeon MD  E-Consult to Specialist  Consult performed by: Augustin Oden DO  Consult ordered by: Adryan Mckeon MD      89 yo F who presents to ER with concerns for lesion between region of chin and neck. CT performed reports "perimandibular swelling with underlying erosion and periosteal reaction of themandibular body reflecting osteomyelitis. No drainable soft tissue abscess identified." Afebrile with no leukocytosis. E consult placed for management of osteomyelitis.     Recommendation: Given lack of culture data and severity of osteomyelitis with risk of metastatic infection, recommend PICC placement for home IV antibiotics. Would cover oral pathogens as well as MRSA due to age and skin breakdown. Recommend 6 week total course. If primary physician would like assistance with managing PICC line and antibiotic therapy they may reach out to me. Clinic is located at both [facility] off [address] and [facility] off [address].     Outpatient Antibiotic Therapy Plan:    1) Infection: Osteomyelitis of mandible     2) Discharge Antibiotics:    Intravenous antibiotics:  IV ceftriaxone 2 g daily    IV vancomycin dosed by pharmacy     Oral antibiotics:  PO metronidazole 500 mg TID     3) Therapy Duration:  6 weeks     Estimated end date of IV antibiotics: 8/27/24    4) Outpatient Weekly Labs:    Order the following labs to be drawn on Mondays:   CBC  CMP   CRP  Vancomycin trough. Target 15-20    If discharged on vancomycin IV, order the following additional labs to be drawn on Thursdays:  CMP   Vancomycin trough. Target 15-20    If vancomycin trough is not at target (15-20) prior to discharge, schedule vancomycin trough to be drawn before their fourth outpatient dose.    Contingency that warrants a repeat eConsult or referral: Clinical " decline     Total time of Consultation: 15 minute    I did speak to the requesting provider verbally about this.     *This eConsult is based on the clinical data available to me and is furnished without benefit of a physical examination. The eConsult will need to be interpreted in light of any clinical issues or changes in patient status not available to me at the time of filing this eConsults. Significant changes in patient condition or level of acuity should result in immediate formal consultation and reevaluation. Please alert me if you have further questions.    Thank you for this eConsult referral.     Augustin Oden, DO  Infectious Diseases

## 2024-07-17 NOTE — PROGRESS NOTES
"Pharmacokinetic Initial Assessment: IV Vancomycin    Assessment/Plan:    Initiate intravenous vancomycin with loading dose of 1000 mg once followed by a maintenance dose of vancomycin 500 mg IV every 24 hours  Desired empiric serum trough concentration is 15 to 20 mcg/mL  Draw vancomycin trough level 60 min prior to third dose on 7/19 at approximately 1800  Pharmacy will continue to follow and monitor vancomycin.      Please contact pharmacy with any questions regarding this assessment.     Thank you for the consult,   Ariadna Bains       Patient brief summary:  Augusta James is a 89 y.o. female initiated on antimicrobial therapy with IV Vancomycin for treatment of suspected bone/joint infection    Drug Allergies:   Review of patient's allergies indicates:  No Known Allergies    Actual Body Weight:   45.4 kg    Renal Function:   Estimated Creatinine Clearance: 31.9 mL/min (based on SCr of 0.84 mg/dL).,     Dialysis Method (if applicable):  N/A    CBC (last 72 hours):  Recent Labs   Lab Result Units 07/17/24  1525   WBC x10(3)/mcL 6.35   Hgb g/dL 10.5*   Hct % 32.6*   Platelet x10(3)/mcL 272   Mono % % 6.5   Eos % % 0.8   Basophil % % 0.6       Metabolic Panel (last 72 hours):  Recent Labs   Lab Result Units 07/17/24  1525   Sodium mmol/L 136   Potassium mmol/L 4.3   Chloride mmol/L 101   CO2 mmol/L 26   Glucose mg/dL 231*   Blood Urea Nitrogen mg/dL 27*   Creatinine mg/dL 0.84   Albumin g/dL 4.1   Bilirubin Total mg/dL 0.5   ALP unit/L 81   AST unit/L 24   ALT unit/L 13       Drug levels (last 3 results):  No results for input(s): "VANCOMYCINRA", "VANCORANDOM", "VANCOMYCINPE", "VANCOPEAK", "VANCOMYCINTR", "VANCOTROUGH" in the last 72 hours.    Microbiologic Results:  Microbiology Results (last 7 days)       ** No results found for the last 168 hours. **            "

## 2024-07-18 LAB
ALBUMIN SERPL-MCNC: 3.6 G/DL (ref 3.4–5)
ALBUMIN/GLOB SERPL: 1 RATIO
ALP SERPL-CCNC: 76 UNIT/L (ref 50–144)
ALT SERPL-CCNC: 10 UNIT/L (ref 1–45)
ANION GAP SERPL CALC-SCNC: 8 MEQ/L (ref 2–13)
AST SERPL-CCNC: 19 UNIT/L (ref 14–36)
BASOPHILS # BLD AUTO: 0.05 X10(3)/MCL (ref 0.01–0.08)
BASOPHILS NFR BLD AUTO: 0.8 % (ref 0.1–1.2)
BILIRUB SERPL-MCNC: 0.2 MG/DL (ref 0–1)
BUN SERPL-MCNC: 23 MG/DL (ref 7–20)
CALCIUM SERPL-MCNC: 10 MG/DL (ref 8.4–10.2)
CHLORIDE SERPL-SCNC: 103 MMOL/L (ref 98–110)
CO2 SERPL-SCNC: 26 MMOL/L (ref 21–32)
CREAT SERPL-MCNC: 0.84 MG/DL (ref 0.66–1.25)
CREAT/UREA NIT SERPL: 27 (ref 12–20)
EOSINOPHIL # BLD AUTO: 0.1 X10(3)/MCL (ref 0.04–0.36)
EOSINOPHIL NFR BLD AUTO: 1.7 % (ref 0.7–7)
ERYTHROCYTE [DISTWIDTH] IN BLOOD BY AUTOMATED COUNT: 15.5 % (ref 11–14.5)
GFR SERPLBLD CREATININE-BSD FMLA CKD-EPI: 66 ML/MIN/1.73/M2
GLOBULIN SER-MCNC: 3.5 GM/DL (ref 2–3.9)
GLUCOSE SERPL-MCNC: 140 MG/DL (ref 70–115)
HCT VFR BLD AUTO: 31.2 % (ref 36–48)
HGB BLD-MCNC: 9.9 G/DL (ref 11.8–16)
IMM GRANULOCYTES # BLD AUTO: 0.01 X10(3)/MCL (ref 0–0.03)
IMM GRANULOCYTES NFR BLD AUTO: 0.2 % (ref 0–0.5)
LYMPHOCYTES # BLD AUTO: 3.92 X10(3)/MCL (ref 1.16–3.74)
LYMPHOCYTES NFR BLD AUTO: 66.4 % (ref 20–55)
MCH RBC QN AUTO: 25.8 PG (ref 27–34)
MCHC RBC AUTO-ENTMCNC: 31.7 G/DL (ref 31–37)
MCV RBC AUTO: 81.5 FL (ref 79–99)
MONOCYTES # BLD AUTO: 0.36 X10(3)/MCL (ref 0.24–0.36)
MONOCYTES NFR BLD AUTO: 6.1 % (ref 4.7–12.5)
NEUTROPHILS # BLD AUTO: 1.46 X10(3)/MCL (ref 1.56–6.13)
NEUTROPHILS NFR BLD AUTO: 24.8 % (ref 37–73)
NRBC BLD AUTO-RTO: 0 %
PLATELET # BLD AUTO: 274 X10(3)/MCL (ref 140–371)
PMV BLD AUTO: 9.6 FL (ref 9.4–12.4)
POCT GLUCOSE: 143 MG/DL (ref 70–110)
POCT GLUCOSE: 188 MG/DL (ref 70–110)
POCT GLUCOSE: 341 MG/DL (ref 70–110)
POTASSIUM SERPL-SCNC: 4 MMOL/L (ref 3.5–5.1)
PROT SERPL-MCNC: 7.1 GM/DL (ref 6.3–8.2)
RBC # BLD AUTO: 3.83 X10(6)/MCL (ref 4–5.1)
SODIUM SERPL-SCNC: 137 MMOL/L (ref 136–145)
WBC # BLD AUTO: 5.9 X10(3)/MCL (ref 4–11.5)

## 2024-07-18 PROCEDURE — 02HV33Z INSERTION OF INFUSION DEVICE INTO SUPERIOR VENA CAVA, PERCUTANEOUS APPROACH: ICD-10-PCS | Performed by: FAMILY MEDICINE

## 2024-07-18 PROCEDURE — 63600175 PHARM REV CODE 636 W HCPCS: Performed by: INTERNAL MEDICINE

## 2024-07-18 PROCEDURE — 80053 COMPREHEN METABOLIC PANEL: CPT | Performed by: INTERNAL MEDICINE

## 2024-07-18 PROCEDURE — 63600175 PHARM REV CODE 636 W HCPCS: Performed by: FAMILY MEDICINE

## 2024-07-18 PROCEDURE — 36569 INSJ PICC 5 YR+ W/O IMAGING: CPT

## 2024-07-18 PROCEDURE — 25000003 PHARM REV CODE 250: Performed by: FAMILY MEDICINE

## 2024-07-18 PROCEDURE — A4216 STERILE WATER/SALINE, 10 ML: HCPCS | Performed by: FAMILY MEDICINE

## 2024-07-18 PROCEDURE — 11000001 HC ACUTE MED/SURG PRIVATE ROOM

## 2024-07-18 PROCEDURE — 25000003 PHARM REV CODE 250: Performed by: INTERNAL MEDICINE

## 2024-07-18 PROCEDURE — 36415 COLL VENOUS BLD VENIPUNCTURE: CPT | Performed by: INTERNAL MEDICINE

## 2024-07-18 PROCEDURE — 94761 N-INVAS EAR/PLS OXIMETRY MLT: CPT

## 2024-07-18 PROCEDURE — 85025 COMPLETE CBC W/AUTO DIFF WBC: CPT | Performed by: INTERNAL MEDICINE

## 2024-07-18 RX ORDER — ENOXAPARIN SODIUM 100 MG/ML
40 INJECTION SUBCUTANEOUS EVERY 24 HOURS
Status: DISCONTINUED | OUTPATIENT
Start: 2024-07-18 | End: 2024-07-19

## 2024-07-18 RX ORDER — SODIUM CHLORIDE 0.9 % (FLUSH) 0.9 %
10 SYRINGE (ML) INJECTION
Status: DISCONTINUED | OUTPATIENT
Start: 2024-07-18 | End: 2024-07-22 | Stop reason: HOSPADM

## 2024-07-18 RX ORDER — METRONIDAZOLE 250 MG/1
500 TABLET ORAL
Status: DISCONTINUED | OUTPATIENT
Start: 2024-07-19 | End: 2024-07-22 | Stop reason: HOSPADM

## 2024-07-18 RX ORDER — SODIUM CHLORIDE 0.9 % (FLUSH) 0.9 %
10 SYRINGE (ML) INJECTION EVERY 6 HOURS
Status: DISCONTINUED | OUTPATIENT
Start: 2024-07-18 | End: 2024-07-22 | Stop reason: HOSPADM

## 2024-07-18 RX ORDER — METRONIDAZOLE 250 MG/1
500 TABLET ORAL
Status: DISCONTINUED | OUTPATIENT
Start: 2024-07-18 | End: 2024-07-18

## 2024-07-18 RX ADMIN — METRONIDAZOLE 500 MG: 500 INJECTION, SOLUTION INTRAVENOUS at 02:07

## 2024-07-18 RX ADMIN — CEFTRIAXONE SODIUM 1 G: 1 INJECTION, POWDER, FOR SOLUTION INTRAMUSCULAR; INTRAVENOUS at 05:07

## 2024-07-18 RX ADMIN — VANCOMYCIN HYDROCHLORIDE 500 MG: 500 INJECTION, POWDER, LYOPHILIZED, FOR SOLUTION INTRAVENOUS at 06:07

## 2024-07-18 RX ADMIN — ESCITALOPRAM OXALATE 10 MG: 10 TABLET ORAL at 08:07

## 2024-07-18 RX ADMIN — CARVEDILOL 6.25 MG: 6.25 TABLET, FILM COATED ORAL at 08:07

## 2024-07-18 RX ADMIN — SODIUM CHLORIDE, PRESERVATIVE FREE 10 ML: 5 INJECTION INTRAVENOUS at 06:07

## 2024-07-18 RX ADMIN — ATORVASTATIN CALCIUM 20 MG: 20 TABLET, FILM COATED ORAL at 08:07

## 2024-07-18 RX ADMIN — METRONIDAZOLE 500 MG: 250 TABLET ORAL at 05:07

## 2024-07-18 RX ADMIN — ENOXAPARIN SODIUM 40 MG: 40 INJECTION SUBCUTANEOUS at 05:07

## 2024-07-18 RX ADMIN — METRONIDAZOLE 500 MG: 500 INJECTION, SOLUTION INTRAVENOUS at 10:07

## 2024-07-18 RX ADMIN — ASPIRIN 81 MG 81 MG: 81 TABLET ORAL at 08:07

## 2024-07-18 RX ADMIN — ACETAMINOPHEN 650 MG: 325 TABLET, FILM COATED ORAL at 08:07

## 2024-07-18 NOTE — H&P
[facility]-Med/Surg    History & Physical      Patient Name: Augusta James  MRN: [id number]  Admission Date: 7/17/2024  Attending Physician: Maninder Salvador MD  Primary Care Provider: Capo Kelly MD         Patient information was obtained from patient and ER records.     Subjective:     Principal Problem:<principal problem not specified>    Chief Complaint:   Chief Complaint   Patient presents with    Facial Swelling     Pt brought to triage via w/c with c/o swelling and redness to rt jaw line.  Onset last week.        HPI:   89 year old woman with history of DM2, HTN, HLD presented with facial/mandible swelling. Patient apparently started having swelling on Monday and presented to PCP where CT scan was performed. Patient was called with results and placed on abx. CT results reported mandibular osteomyelitis and patient presented to ED    ED course; ID consulted and recommended PICC line and IV abx: vancomycin/ceftriaxone/flagyl     Past Medical History:   Diagnosis Date    Arthritis     Diabetes mellitus     Hypertension        Past Surgical History:   Procedure Laterality Date    EYE SURGERY      HYSTERECTOMY      TONSILLECTOMY         Review of patient's allergies indicates:  No Known Allergies    No current facility-administered medications on file prior to encounter.     Current Outpatient Medications on File Prior to Encounter   Medication Sig    amoxicillin (AMOXIL) 875 MG tablet Take 875 mg by mouth 2 (two) times daily.    aspirin 81 mg Cap Take 81 mg by mouth once daily.    carvediloL (COREG) 6.25 MG tablet Take 6.25 mg by mouth 2 (two) times daily.    dapagliflozin propanediol (FARXIGA) 5 mg Tab tablet Take 10 mg by mouth once daily.    doxycycline (VIBRAMYCIN) 100 MG Cap Take 100 mg by mouth every 12 (twelve) hours.    EScitalopram oxalate (LEXAPRO) 10 MG tablet Take 10 mg by mouth once daily.    hydroCHLOROthiazide (HYDRODIURIL) 25 MG tablet Take 25 mg by mouth once daily.    lovastatin  (MEVACOR) 20 MG tablet Take 20 mg by mouth every evening.    metFORMIN (GLUCOPHAGE-XR) 500 MG ER 24hr tablet Take 500 mg by mouth 2 (two) times daily.    alendronate (FOSAMAX) 70 MG tablet Take 70 mg by mouth every 7 days.    [DISCONTINUED] pantoprazole (PROTONIX) 40 MG tablet Take 40 mg by mouth once daily.    [DISCONTINUED] traMADoL (ULTRAM) 50 mg tablet Take 50 mg by mouth.     Family History    None       Tobacco Use    Smoking status: Never    Smokeless tobacco: Never   Substance and Sexual Activity    Alcohol use: Never    Drug use: Never    Sexual activity: Not Currently     Review of Systems   All other systems reviewed and are negative.    Objective:     Vital Signs (Most Recent):  Temp: 97.6 °F (36.4 °C) (07/17/24 1833)  Pulse: 61 (07/17/24 1908)  Resp: 16 (07/17/24 1833)  BP: (!) 183/71 (07/17/24 1833)  SpO2: 96 % (07/17/24 1908) Vital Signs (24h Range):  Temp:  [97.6 °F (36.4 °C)-98.2 °F (36.8 °C)] 97.6 °F (36.4 °C)  Pulse:  [61-72] 61  Resp:  [16-20] 16  SpO2:  [96 %-99 %] 96 %  BP: (123-183)/(52-87) 183/71     Weight: 44.2 kg (97 lb 6.4 oz)  Body mass index is 19.67 kg/m².    Physical Exam  Vitals reviewed. Exam conducted with a chaperone present.   Constitutional:       Appearance: Normal appearance. She is normal weight.   HENT:      Head: Normocephalic and atraumatic.      Nose: Nose normal.      Mouth/Throat:      Mouth: Mucous membranes are moist.      Pharynx: Oropharynx is clear.   Eyes:      Extraocular Movements: Extraocular movements intact.      Conjunctiva/sclera: Conjunctivae normal.      Pupils: Pupils are equal, round, and reactive to light.   Cardiovascular:      Rate and Rhythm: Normal rate and regular rhythm.      Pulses: Normal pulses.      Heart sounds: Normal heart sounds.   Pulmonary:      Effort: Pulmonary effort is normal.      Breath sounds: Normal breath sounds.   Abdominal:      General: Abdomen is flat. There is distension.      Palpations: Abdomen is soft.    Musculoskeletal:         General: Normal range of motion.      Cervical back: Normal range of motion and neck supple.   Skin:     General: Skin is warm.   Neurological:      General: No focal deficit present.      Mental Status: She is alert. Mental status is at baseline.           CRANIAL NERVES     CN III, IV, VI   Pupils are equal, round, and reactive to light.      Significant Labs: All pertinent labs within the past 24 hours have been reviewed.  Recent Lab Results         07/17/24  1525        Albumin/Globulin Ratio 1.0       Albumin 4.1       ALP 81       ALT 13       Anion Gap 9.0       AST 24       Baso # 0.04       Basophil % 0.6       BILIRUBIN TOTAL 0.5       BUN 27       BUN/CREAT RATIO 32       Calcium 10.3       Chloride 101       CO2 26       Creatinine 0.84       CRP 1.70       eGFR 66  Comment:                      EGFR INTERPRETATION    Beginning 8/15/22 we are reporting the eGFRcr calculation as recommended by the National Kid[employer]. The eGFRcr equation has similar overall performance characteristics to the older equation, but the values may differ by more than 10% particularly at higher values of eGFRcr and younger adult ages.    NKF stages of chronic kidney disease (CKD)  Stage 1: Kidney damage with normal or increased eGFR (>90 mL/min/1.73 m^2)  Stage 2: Mild reduction in GFR (60-89 mL/min/1.73 m^2)  Stage 3a: Moderate reduction in GFR (45-59 mL/min/1.73 m^2)  Stage 3b: Moderate reduction in GFR (30-44 mL/min/1.73 m^2)  Stage 4: Severe reduction in GFR (15-29 mL/min/1.73 m^2)  Stage 5: Kidney failure (GFR <15 mL/min/1.73 m^2)           Eos # 0.05       Eos % 0.8       Globulin, Total 4.3       Glucose 231       Hematocrit 32.6       Hemoglobin 10.5       Immature Grans (Abs) 0.01       Immature Granulocytes 0.2       Lymph # 3.55       LYMPH % 55.9       MCH 26.0       MCHC 32.2       MCV 80.7       Mono # 0.41       Mono % 6.5       MPV 9.4       Neut # 2.29       Neut % 36.0        nRBC 0.0       Platelet Count 272       Potassium 4.3       PROTEIN TOTAL 8.4       RBC 4.04       RDW 15.3       Sed Rate 101       Sodium 136       WBC 6.35               Significant Imaging: I have reviewed all pertinent imaging results/findings within the past 24 hours.  I have reviewed and interpreted all pertinent imaging results/findings within the past 24 hours.    Assessment/Plan:     Active Diagnoses:    Diagnosis Date Noted POA    Osteomyelitis [M86.9] 07/17/2024 Unknown      Problems Resolved During this Admission:     VTE Risk Mitigation (From admission, onward)           Ordered     heparin (porcine) injection 5,000 Units  Every 8 hours         07/17/24 1827     IP VTE HIGH RISK PATIENT  Once         07/17/24 1827     Place sequential compression device  Until discontinued         07/17/24 1827                  *Facial osteomyelitis  - Per ED received call from radiology that previous CT scan showed osteomyelitis  - ED called ID and recommendations were for PICC line with vancomycin, ceftriaxone, flagyl  - Admitted for IV abx and PICC line  - ED will discuss with surgery regarding PICC placement     *DM2  - ISS while in house, goal glucose 140-180    *HTN  - Resumed losartan    *HLD  - Resumed statin     Code Status: Full Code  Diet: Diabetic  DVT PPX: Heparin SUbQ    Services provided via two way audio/visual telecommunication  Provider location: [place], [place]  Patient location: KATLIN Hoskins MD  Department of Hospital Medicine   [facility]

## 2024-07-18 NOTE — NURSING
Nurses Note -- 4 Eyes      7/17/2024   8:52 PM      Skin assessed during: Admit      [] No Altered Skin Integrity Present    []Prevention Measures Documented      [x] Yes- Altered Skin Integrity Present or Discovered   [x] LDA Added if Not in Epic (Describe Wound)   [x] New Altered Skin Integrity was Present on Admit and Documented in LDA   [x] Wound Image Taken    Wound Care Consulted? Yes    Attending Nurse:  Candida Andino RN/Staff Member:  Macey

## 2024-07-18 NOTE — PROGRESS NOTES
Hospital Medicine  Progress Note    Patient Name: Augusta James  MRN: [id number]  Status: IP- Inpatient   Admission Date: 7/17/2024  Length of Stay: 1  Date of Service: 07/18/2024       CC: hospital follow-up for        SUBJECTIVE   89 year old woman with history of DM2, HTN, HLD presented with facial/mandible swelling. Patient apparently started having swelling on Monday and presented to PCP where CT scan was performed. Patient was called with results and placed on abx. CT results reported mandibular osteomyelitis and patient presented to ED     ED course; ID consulted and recommended PICC line and IV abx: vancomycin/ceftriaxone/flagyl     07/18/2024  No new c/o  Lab stable   Plan PICC line today  Cont iv abx  Consult case mgmt for home iv abx     Today: Patient seen and examined at bedside, and chart reviewed.       MEDICATIONS   Scheduled   aspirin  81 mg Oral Daily    atorvastatin  20 mg Oral QHS    carvediloL  6.25 mg Oral BID    cefTRIAXone (Rocephin) IV (PEDS and ADULTS)  1 g Intravenous Q24H    EScitalopram oxalate  10 mg Oral Daily    heparin (porcine)  5,000 Units Subcutaneous Q8H    metroNIDAZOLE IV (PEDS and ADULTS)  500 mg Intravenous Q8H    vancomycin (VANCOCIN) IV (PEDS and ADULTS)  500 mg Intravenous Q24H     Continuous Infusions        PHYSICAL EXAM   VITALS: T 97.7 °F (36.5 °C)   BP (!) 128/53   P (!) 57   RR 18   O2 98 %    GENERAL: Awake and in NAD  HEENT: R jaw swelling  LUNGS: CTA B/L  CVS: Normal rate  GI/: Soft, NT, bowel sounds positive.  EXTREMITIES: No peripheral edema  NEURO: AAOx3  PSYCH: Cooperative      LABS   CBC  Recent Labs     07/17/24  1525 07/18/24  0505   WBC 6.35 5.90   RBC 4.04 3.83*   HGB 10.5* 9.9*   HCT 32.6* 31.2*   MCV 80.7 81.5   MCH 26.0* 25.8*   MCHC 32.2 31.7   RDW 15.3* 15.5*    274     CHEM  Recent Labs     07/17/24  1525 07/18/24  0505    137   K 4.3 4.0   CO2 26 26   BUN 27* 23*   CREATININE 0.84 0.84   GLUCOSE 231* 140*   CALCIUM 10.3* 10.0   ALBUMIN  4.1 3.6   GLOBULIN 4.3* 3.5   ALKPHOS 81 76   ALT 13 10   AST 24 19   BILITOT 0.5 0.2   CRP 1.70*  --          MICROBIOLOGY     Microbiology Results (last 7 days)       ** No results found for the last 168 hours. **              DIAGNOSTICS   CT Lumbar Spine Without Contrast  Narrative: EXAMINATION:  CT LUMBAR SPINE WITHOUT CONTRAST    CLINICAL HISTORY:  fall, back pain;    TECHNIQUE:  Low-dose axial, sagittal and coronal reformations are obtained through the lumbar spine.  Contrast was not administered.  An automated dose exposure technique was utilized this limits radiation does the patient.    COMPARISON:  None.    FINDINGS:  Five lumbar type vertebral bodies.  The alignment demonstrates grade 1 anterolisthesis of L4 on L5 without evidence for spondylolysis nor pars defect.  Normal lordotic curvature.  The vertebral heights are maintained with diffuse osteopenia.  Nodes are compression deformity, fracture, or subluxation.  Areas of intervertebral disc space narrowing is identified.  Disc desiccation is noted.  Severe central canal stenosis is identified at L4-5 other areas of central canal stenosis are identified also at L3-L4.  Areas of neural foraminal narrowing are noted.    The visualized hollow and solid viscera are grossly normal with diffuse atheromatous disease of the aorta.  Lung bases are clear.  Impression: Diffuse spondylotic changes lumbar spine without evidence for fracture.  Greatest affected levels L4-5    Electronically signed by: Rik Xiao MD  Date:    03/24/2024  Time:    16:42  CT Cervical Spine Without Contrast  Narrative: EXAMINATION:  CT CERVICAL SPINE WITHOUT CONTRAST    CLINICAL HISTORY:  fall;    TECHNIQUE:  Low dose axial images, sagittal and coronal reformations were performed though the cervical spine.  Contrast was not administered.  An automated dose exposure technique was utilized this limits radiation does the patient.    COMPARISON:  None    FINDINGS:  The alignment curvature  of the cervical spine is normal.  The vertebral heights are maintained with disc desiccation intervertebral disc space narrowing.  No evidence for compression deformity, fracture, or subluxation.  Osteopenia is identified.  No evidence for central canal stenosis.  Multilevel uncovertebral facet arthropathy identified with neural foraminal narrowing.    Soft tissues of neck are grossly normal.  Atheromatous disease of the carotid bulbs are noted.  Poor dentition is noted.  Impression: Spondylotic changes of cervical spine without evidence for fracture.    Electronically signed by: Rik Xiao MD  Date:    03/24/2024  Time:    16:33  CT Head Without Contrast  Narrative: EXAMINATION:  CT HEAD WITHOUT CONTRAST    CLINICAL HISTORY:  fall;    TECHNIQUE:  Low dose axial CT images obtained throughout the head without intravenous contrast. Sagittal and coronal reconstructions were performed.  An automated dose exposure technique was utilized this limits radiation does the patient.    COMPARISON:  Six hundred twenty-three    FINDINGS:  Intracranial compartment:    Ventricles and sulci are normal in size for age without evidence of hydrocephalus. No extra-axial blood or fluid collections.    The brain parenchyma appears normal. No parenchymal mass, hemorrhage, edema or major vascular distribution infarct.    Skull/extracranial contents (limited evaluation): No fracture. Mastoid air cells and paranasal sinuses are essentially clear.  Impression: No acute abnormality.  Changes of microangiopathy and age-appropriate volume loss.    Electronically signed by: Rik Xiao MD  Date:    03/24/2024  Time:    16:29        ASSESSMENT/PLAN:     *Facial osteomyelitis  - Per ED received call from radiology that previous CT scan showed osteomyelitis  - ED called ID and recommendations were for PICC line with vancomycin, ceftriaxone, flagyl  - Admitted for IV abx and PICC line  - PICC placement today  -case mgmt consult for home iv abx      *DM2  - ISS while in house, goal glucose 140-180     *HTN  - Resumed losartan     *HLD  - Resumed statin             Nj Turner MD  [facility] Medicine

## 2024-07-18 NOTE — PLAN OF CARE
07/18/24 1033   Discharge Assessment   Assessment Type Discharge Planning Assessment   Confirmed/corrected address, phone number and insurance Yes   Confirmed Demographics Correct on Facesheet   Source of Information patient;family   When was your last doctors appointment? 07/17/24   Communicated LUCILA with patient/caregiver Date not available/Unable to determine   Reason For Admission osteomylitis   People in Home spouse   Facility Arrived From: home   Do you expect to return to your current living situation? Yes   Do you have help at home or someone to help you manage your care at home? No   Who are your caregiver(s) and their phone number(s)? daughter Almita Perez [phone]   Prior to hospitilization cognitive status: Alert/Oriented   Current cognitive status: Alert/Oriented   Walking or Climbing Stairs Difficulty yes   Walking or Climbing Stairs ambulation difficulty, requires equipment   Mobility Management walker   Dressing/Bathing Difficulty no   Home Layout Able to live on 1st floor   Equipment Currently Used at Home rollator   Readmission within 30 days? No   Patient currently being followed by outpatient case management? No   Do you currently have service(s) that help you manage your care at home? No   Do you take prescription medications? Yes   Do you have prescription coverage? Yes   Coverage [employer]   Do you have any problems affording any of your prescribed medications? No   Is the patient taking medications as prescribed? yes   Who is going to help you get home at discharge? daughter   How do you get to doctors appointments? family or friend will provide   Are you on dialysis? No   Do you take coumadin? No   Discharge Plan A Home;Home Health   Discharge Plan B Skilled Nursing Facility   DME Needed Upon Discharge  medication pump   Discharge Plan discussed with: Patient;Adult children   Transition of Care Barriers None   Physical Activity   On average, how many days per week do you engage in  moderate to strenuous exercise (like a brisk walk)? 0 days   On average, how many minutes do you engage in exercise at this level? 0 min   Financial Resource Strain   How hard is it for you to pay for the very basics like food, housing, medical care, and heating? Not very   Housing Stability   In the last 12 months, was there a time when you were not able to pay the mortgage or rent on time? N   At any time in the past 12 months, were you homeless or living in a shelter (including now)? N   Transportation Needs   Has the lack of transportation kept you from medical appointments, meetings, work or from getting things needed for daily living? No   Food Insecurity   Within the past 12 months, you worried that your food would run out before you got the money to buy more. Never true   Within the past 12 months, the food you bought just didn't last and you didn't have money to get more. Never true   Stress   Do you feel stress - tense, restless, nervous, or anxious, or unable to sleep at night because your mind is troubled all the time - these days? Only a littl   Social Isolation   How often do you feel lonely or isolated from those around you?  Rarely   Alcohol Use   Q1: How often do you have a drink containing alcohol? Never   Q2: How many drinks containing alcohol do you have on a typical day when you are drinking? None   Q3: How often do you have six or more drinks on one occasion? Never   Utilities   In the past 12 months has the electric, gas, oil, or water company threatened to shut off services in your home? No   Health Literacy   How often do you need to have someone help you when you read instructions, pamphlets, or other written material from your doctor or pharmacy? Sometimes     Patient will need  and home AMY at [place]. Patient recently discharged from [facility] and would like to resume their services. Daughter states she has arranged a close family friend who is her fathers caregiver at home to help patient  with home AMY administration. LTAC versus SNF discussed for long term AMY. Patient and her family prefer [place] home with AMY at this time

## 2024-07-18 NOTE — PROGRESS NOTES
[facility]-Med/Surg  Wound Care    Patient Name:  Augusta James   MRN:  [id number]  Date: 7/18/2024  Diagnosis: <principal problem not specified>    History:     Past Medical History:   Diagnosis Date    Arthritis     Diabetes mellitus     Hypertension        Social History     Socioeconomic History    Marital status: Single   Tobacco Use    Smoking status: Never    Smokeless tobacco: Never   Substance and Sexual Activity    Alcohol use: Never    Drug use: Never    Sexual activity: Not Currently     Social Determinants of Health     Financial Resource Strain: Low Risk  (7/18/2024)    Overall Financial Resource Strain (CARDIA)     Difficulty of Paying Living Expenses: Not very hard   Food Insecurity: No Food Insecurity (7/18/2024)    Hunger Vital Sign     Worried About Running Out of Food in the Last Year: Never true     Ran Out of Food in the Last Year: Never true   Transportation Needs: No Transportation Needs (7/18/2024)    TRANSPORTATION NEEDS     Transportation : No   Physical Activity: Inactive (7/18/2024)    Exercise Vital Sign     Days of Exercise per Week: 0 days     Minutes of Exercise per Session: 0 min   Stress: No Stress Concern Present (7/18/2024)    [employer] [place] [employer] - Occupational Stress Questionnaire     Feeling of Stress : Only a little   Housing Stability: Low Risk  (7/18/2024)    Housing Stability Vital Sign     Unable to Pay for Housing in the Last Year: No     Homeless in the Last Year: No       Precautions:     Allergies as of 07/17/2024    (No Known Allergies)       WOC Assessment Details/Treatment        07/18/24 1621   WOCN Assessment   WOCN Total Time (mins) 20   Visit Date 07/18/24   Visit Time 1600   Consult Type New   WOCN Speciality Wound   Intervention assessed;applied   Teaching on-going        Wound 07/17/24 1900 Other (comment) [place] #1   Date First Assessed/Time First Assessed: 07/17/24 1900   Present on Original Admission: Yes  Primary Wound Type:  Other (comment)  Location: [facility]  Wound Number: #1   Wound Image    Dressing Appearance Open to air   Drainage Amount None   Appearance Red;Moist;Hypergranulation   Red (%), Wound Tissue Color 100 %   Periwound Area Redness;Swelling   Wound Edges Defined   Wound Length (cm) 1.2 cm   Wound Width (cm) 1.3 cm   Wound Depth (cm) 0.1 cm   Wound Volume (cm^3) 0.156 cm^3   Wound Surface Area (cm^2) 1.56 cm^2   Care Cleansed with:;Wound cleanser  (vashe)   Dressing Applied;Hydrofiber;Silver;Foam   Dressing Change Due 07/21/24      Orders placed.     07/18/2024

## 2024-07-19 LAB
ANION GAP SERPL CALC-SCNC: 8 MEQ/L (ref 2–13)
BASOPHILS # BLD AUTO: 0.05 X10(3)/MCL (ref 0.01–0.08)
BASOPHILS NFR BLD AUTO: 0.8 % (ref 0.1–1.2)
BUN SERPL-MCNC: 28 MG/DL (ref 7–20)
CALCIUM SERPL-MCNC: 10.3 MG/DL (ref 8.4–10.2)
CHLORIDE SERPL-SCNC: 102 MMOL/L (ref 98–110)
CK SERPL-CCNC: 42 U/L (ref 30–135)
CO2 SERPL-SCNC: 26 MMOL/L (ref 21–32)
CREAT SERPL-MCNC: 1.06 MG/DL (ref 0.66–1.25)
CREAT/UREA NIT SERPL: 26 (ref 12–20)
EOSINOPHIL # BLD AUTO: 0.11 X10(3)/MCL (ref 0.04–0.36)
EOSINOPHIL NFR BLD AUTO: 1.8 % (ref 0.7–7)
ERYTHROCYTE [DISTWIDTH] IN BLOOD BY AUTOMATED COUNT: 15.2 % (ref 11–14.5)
GFR SERPLBLD CREATININE-BSD FMLA CKD-EPI: 50 ML/MIN/1.73/M2
GLUCOSE SERPL-MCNC: 184 MG/DL (ref 70–115)
GLUCOSE SERPL-MCNC: ABNORMAL MG/DL (ref 70–110)
HCT VFR BLD AUTO: 33.5 % (ref 36–48)
HGB BLD-MCNC: 10.5 G/DL (ref 11.8–16)
IMM GRANULOCYTES # BLD AUTO: 0 X10(3)/MCL (ref 0–0.03)
IMM GRANULOCYTES NFR BLD AUTO: 0 % (ref 0–0.5)
LYMPHOCYTES # BLD AUTO: 4.09 X10(3)/MCL (ref 1.16–3.74)
LYMPHOCYTES NFR BLD AUTO: 65.2 % (ref 20–55)
MCH RBC QN AUTO: 25.5 PG (ref 27–34)
MCHC RBC AUTO-ENTMCNC: 31.3 G/DL (ref 31–37)
MCV RBC AUTO: 81.3 FL (ref 79–99)
MONOCYTES # BLD AUTO: 0.38 X10(3)/MCL (ref 0.24–0.36)
MONOCYTES NFR BLD AUTO: 6.1 % (ref 4.7–12.5)
NEUTROPHILS # BLD AUTO: 1.64 X10(3)/MCL (ref 1.56–6.13)
NEUTROPHILS NFR BLD AUTO: 26.1 % (ref 37–73)
NRBC BLD AUTO-RTO: 0 %
PLATELET # BLD AUTO: 274 X10(3)/MCL (ref 140–371)
PMV BLD AUTO: 9 FL (ref 9.4–12.4)
POCT GLUCOSE: 169 MG/DL (ref 70–110)
POCT GLUCOSE: 201 MG/DL (ref 70–110)
POCT GLUCOSE: 216 MG/DL (ref 70–110)
POTASSIUM SERPL-SCNC: 4.5 MMOL/L (ref 3.5–5.1)
RBC # BLD AUTO: 4.12 X10(6)/MCL (ref 4–5.1)
SODIUM SERPL-SCNC: 136 MMOL/L (ref 136–145)
WBC # BLD AUTO: 6.27 X10(3)/MCL (ref 4–11.5)

## 2024-07-19 PROCEDURE — 80048 BASIC METABOLIC PNL TOTAL CA: CPT | Performed by: FAMILY MEDICINE

## 2024-07-19 PROCEDURE — 85025 COMPLETE CBC W/AUTO DIFF WBC: CPT | Performed by: FAMILY MEDICINE

## 2024-07-19 PROCEDURE — A4216 STERILE WATER/SALINE, 10 ML: HCPCS | Performed by: FAMILY MEDICINE

## 2024-07-19 PROCEDURE — 82550 ASSAY OF CK (CPK): CPT | Performed by: FAMILY MEDICINE

## 2024-07-19 PROCEDURE — 94761 N-INVAS EAR/PLS OXIMETRY MLT: CPT

## 2024-07-19 PROCEDURE — 25000003 PHARM REV CODE 250: Performed by: FAMILY MEDICINE

## 2024-07-19 PROCEDURE — 25000003 PHARM REV CODE 250: Performed by: INTERNAL MEDICINE

## 2024-07-19 PROCEDURE — 36415 COLL VENOUS BLD VENIPUNCTURE: CPT | Performed by: FAMILY MEDICINE

## 2024-07-19 PROCEDURE — 63600175 PHARM REV CODE 636 W HCPCS: Performed by: FAMILY MEDICINE

## 2024-07-19 PROCEDURE — 11000001 HC ACUTE MED/SURG PRIVATE ROOM

## 2024-07-19 RX ORDER — MUPIROCIN 20 MG/G
OINTMENT TOPICAL 2 TIMES DAILY
Status: DISCONTINUED | OUTPATIENT
Start: 2024-07-19 | End: 2024-07-22 | Stop reason: HOSPADM

## 2024-07-19 RX ORDER — ENOXAPARIN SODIUM 100 MG/ML
30 INJECTION SUBCUTANEOUS EVERY 24 HOURS
Status: DISCONTINUED | OUTPATIENT
Start: 2024-07-19 | End: 2024-07-21

## 2024-07-19 RX ADMIN — MUPIROCIN: 20 OINTMENT TOPICAL at 09:07

## 2024-07-19 RX ADMIN — INSULIN ASPART 4 UNITS: 100 INJECTION, SOLUTION INTRAVENOUS; SUBCUTANEOUS at 05:07

## 2024-07-19 RX ADMIN — SODIUM CHLORIDE, PRESERVATIVE FREE 10 ML: 5 INJECTION INTRAVENOUS at 10:07

## 2024-07-19 RX ADMIN — SODIUM CHLORIDE, PRESERVATIVE FREE 10 ML: 5 INJECTION INTRAVENOUS at 05:07

## 2024-07-19 RX ADMIN — ACETAMINOPHEN 650 MG: 325 TABLET, FILM COATED ORAL at 09:07

## 2024-07-19 RX ADMIN — ENOXAPARIN SODIUM 30 MG: 30 INJECTION SUBCUTANEOUS at 05:07

## 2024-07-19 RX ADMIN — INSULIN ASPART 4 UNITS: 100 INJECTION, SOLUTION INTRAVENOUS; SUBCUTANEOUS at 11:07

## 2024-07-19 RX ADMIN — ESCITALOPRAM OXALATE 10 MG: 10 TABLET ORAL at 08:07

## 2024-07-19 RX ADMIN — CARVEDILOL 6.25 MG: 6.25 TABLET, FILM COATED ORAL at 09:07

## 2024-07-19 RX ADMIN — METRONIDAZOLE 500 MG: 250 TABLET ORAL at 09:07

## 2024-07-19 RX ADMIN — DAPTOMYCIN 350 MG: 500 INJECTION, POWDER, LYOPHILIZED, FOR SOLUTION INTRAVENOUS at 01:07

## 2024-07-19 RX ADMIN — ASPIRIN 81 MG 81 MG: 81 TABLET ORAL at 08:07

## 2024-07-19 RX ADMIN — INSULIN ASPART 2 UNITS: 100 INJECTION, SOLUTION INTRAVENOUS; SUBCUTANEOUS at 09:07

## 2024-07-19 RX ADMIN — METRONIDAZOLE 500 MG: 250 TABLET ORAL at 01:07

## 2024-07-19 RX ADMIN — SODIUM CHLORIDE, PRESERVATIVE FREE 10 ML: 5 INJECTION INTRAVENOUS at 11:07

## 2024-07-19 RX ADMIN — METRONIDAZOLE 500 MG: 250 TABLET ORAL at 05:07

## 2024-07-19 RX ADMIN — CARVEDILOL 6.25 MG: 6.25 TABLET, FILM COATED ORAL at 08:07

## 2024-07-19 RX ADMIN — ACETAMINOPHEN 650 MG: 325 TABLET, FILM COATED ORAL at 08:07

## 2024-07-19 RX ADMIN — ATORVASTATIN CALCIUM 20 MG: 20 TABLET, FILM COATED ORAL at 09:07

## 2024-07-19 RX ADMIN — CEFTRIAXONE SODIUM 1 G: 1 INJECTION, POWDER, FOR SOLUTION INTRAMUSCULAR; INTRAVENOUS at 05:07

## 2024-07-19 NOTE — PROGRESS NOTES
Pharmacist Renal Dose Adjustment Note    Augusta James is a 89 y.o. female being treated with the medication daptomycin    Patient Data:    Vital Signs (Most Recent):  Temp: 97.6 °F (36.4 °C) (07/19/24 0701)  Pulse: 61 (07/19/24 0729)  Resp: 18 (07/19/24 0729)  BP: (!) 117/57 (07/19/24 0811)  SpO2: 97 % (07/19/24 0729) Vital Signs (72h Range):  Temp:  [97.6 °F (36.4 °C)-98.9 °F (37.2 °C)]   Pulse:  [56-72]   Resp:  [16-20]   BP: (113-183)/(49-87)   SpO2:  [96 %-99 %]      Recent Labs   Lab 07/17/24  1525 07/18/24  0505 07/19/24  0408   CREATININE 0.84 0.84 1.06     Serum creatinine: 1.06 mg/dL 07/19/24 0408  Estimated creatinine clearance: 24.6 mL/min    Medication:daptomycin dose: 350mg frequency q24h will be changed to medication:daptomycin dose:350mg frequency:q48h    Pharmacist's Name: Fifi Grady  Pharmacist's Extension: [phone]

## 2024-07-19 NOTE — PROGRESS NOTES
Hospital Medicine  Progress Note    Patient Name: Augusta James  MRN: [id number]  Status: IP- Inpatient   Admission Date: 7/17/2024  Length of Stay: 2  Date of Service: 07/19/2024       CC: hospital follow-up for        SUBJECTIVE     89 year old woman with history of DM2, HTN, HLD presented with facial/mandible swelling. Patient apparently started having swelling on Monday and presented to PCP where CT scan was performed. Patient was called with results and placed on abx. 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       MEDICATIONS   Scheduled   aspirin  81 mg Oral Daily    atorvastatin  20 mg Oral QHS    carvediloL  6.25 mg Oral BID    cefTRIAXone (Rocephin) IV (PEDS and ADULTS)  1 g Intravenous Q24H    DAPTOmycin (CUBICIN) IV (PEDS and ADULTS)  350 mg Intravenous Q48H    enoxparin  30 mg Subcutaneous Q24H (prophylaxis, 1700)    EScitalopram oxalate  10 mg Oral Daily    metroNIDAZOLE  500 mg Oral Q8H    mupirocin   Nasal BID    sodium chloride 0.9%  10 mL Intravenous Q6H     Continuous Infusions        PHYSICAL EXAM   VITALS: T 97.6 °F (36.4 °C)   BP (!) 117/57   P 61   RR 18   O2 97 %    GENERAL: Awake and in NAD  LUNGS: CTA B/L  CVS: Normal rate  GI/: Soft, NT, bowel sounds positive.  EXTREMITIES: No peripheral edema  NEURO: AAOx3  PSYCH: Cooperative      LABS   CBC  Recent Labs     07/18/24  0505 07/19/24  0408   WBC 5.90 6.27   RBC 3.83* 4.12   HGB 9.9* 10.5*   HCT 31.2* 33.5*   MCV 81.5 81.3   MCH 25.8* 25.5*   MCHC 31.7 31.3   RDW 15.5* 15.2*    274     CHEM  Recent Labs     07/17/24  1525 07/18/24  0505 07/19/24  0408    137  136   K 4.3 4.0 4.5   CO2 26 26 26   BUN 27* 23* 28*   CREATININE 0.84 0.84 1.06   GLUCOSE 231* 140* 184*   CALCIUM 10.3* 10.0 10.3*   ALBUMIN 4.1 3.6  --    GLOBULIN 4.3* 3.5  --    ALKPHOS 81 76  --    ALT 13 10  --    AST 24 19  --    BILITOT 0.5 0.2  --    CRP 1.70*  --   --          MICROBIOLOGY     Microbiology Results (last 7 days)       ** No results found for the last 168 hours. **              DIAGNOSTICS   X-Ray Chest AP Portable  Narrative: EXAMINATION:  Chest one view    CLINICAL HISTORY:  PICC line placement    COMPARISON:  None    FINDINGS:  Left-sided intra PICC line tip projects over the right atrium.  There is no visible pneumothorax.  Lungs are clear.  Cardiac silhouette is normal in size.  There is no pleural effusion.  Impression: Left-sided intra PICC line tip projects over the right atrium.    No acute cardiopulmonary process.    Electronically signed by: Mukesh Rick MD  Date:    07/18/2024  Time:    17:40        ASSESSMENT/PLAN:     *Facial osteomyelitis  - Per ED received call from radiology that previous CT scan showed osteomyelitis  - ED called ID and recommendations were for PICC line withceftriaxone, flagyl  - Admitted for IV abx and PICC line  - PICC placement today  -case mgmt consult for home iv abx  -plan daptomycin q 48 hr, rocephin 2 gm q 24 and po flagyl 500mg q8 hr     *DM2  - ISS while in house, goal glucose 140-180     *HTN  - Resumed losartan     *HLD  - Resumed statin               Nj Turner MD  [facility] Medicine

## 2024-07-19 NOTE — PROGRESS NOTES
[facility]-Med/Surg  Wound Care    Patient Name:  Augusta James   MRN:  [id number]  Date: 7/19/2024  Diagnosis: Osteomyelitis    History:     Past Medical History:   Diagnosis Date    Arthritis     Diabetes mellitus     Hypertension        Social History     Socioeconomic History    Marital status: Single   Tobacco Use    Smoking status: Never    Smokeless tobacco: Never   Substance and Sexual Activity    Alcohol use: Never    Drug use: Never    Sexual activity: Not Currently     Social Determinants of Health     Financial Resource Strain: Low Risk  (7/18/2024)    Overall Financial Resource Strain (CARDIA)     Difficulty of Paying Living Expenses: Not very hard   Food Insecurity: No Food Insecurity (7/18/2024)    Hunger Vital Sign     Worried About Running Out of Food in the Last Year: Never true     Ran Out of Food in the Last Year: Never true   Transportation Needs: No Transportation Needs (7/18/2024)    TRANSPORTATION NEEDS     Transportation : No   Physical Activity: Inactive (7/18/2024)    Exercise Vital Sign     Days of Exercise per Week: 0 days     Minutes of Exercise per Session: 0 min   Stress: No Stress Concern Present (7/18/2024)    [employer] [place] [employer] - Occupational Stress Questionnaire     Feeling of Stress : Only a little   Housing Stability: Low Risk  (7/18/2024)    Housing Stability Vital Sign     Unable to Pay for Housing in the Last Year: No     Homeless in the Last Year: No       Precautions:     Allergies as of 07/17/2024    (No Known Allergies)       WOC Assessment Details/Treatment        07/19/24 1422   WOCN Assessment   WOCN Total Time (mins) 5   Visit Date 07/19/24   Visit Time 1420   Intervention assessed        Wound 07/17/24 1900 Other (comment) [place] #1   Date First Assessed/Time First Assessed: 07/17/24 1900   Present on Original Admission: Yes  Primary Wound Type: Other (comment)  Location: [facility]  Wound Number: #1   Dressing Appearance Dry;Intact;Clean       Patient awaiting DC to home.  Pending insurance approval.      07/19/2024

## 2024-07-20 LAB
ANION GAP SERPL CALC-SCNC: 6 MEQ/L (ref 2–13)
BASOPHILS # BLD AUTO: 0.04 X10(3)/MCL (ref 0.01–0.08)
BASOPHILS NFR BLD AUTO: 0.6 % (ref 0.1–1.2)
BUN SERPL-MCNC: 26 MG/DL (ref 7–20)
CALCIUM SERPL-MCNC: 10.2 MG/DL (ref 8.4–10.2)
CHLORIDE SERPL-SCNC: 106 MMOL/L (ref 98–110)
CO2 SERPL-SCNC: 26 MMOL/L (ref 21–32)
CREAT SERPL-MCNC: 0.85 MG/DL (ref 0.66–1.25)
CREAT/UREA NIT SERPL: 31 (ref 12–20)
EOSINOPHIL # BLD AUTO: 0.15 X10(3)/MCL (ref 0.04–0.36)
EOSINOPHIL NFR BLD AUTO: 2.3 % (ref 0.7–7)
ERYTHROCYTE [DISTWIDTH] IN BLOOD BY AUTOMATED COUNT: 15.4 % (ref 11–14.5)
GFR SERPLBLD CREATININE-BSD FMLA CKD-EPI: 65 ML/MIN/1.73/M2
GLUCOSE SERPL-MCNC: 123 MG/DL (ref 70–115)
HCT VFR BLD AUTO: 32.4 % (ref 36–48)
HGB BLD-MCNC: 10.3 G/DL (ref 11.8–16)
IMM GRANULOCYTES # BLD AUTO: 0.01 X10(3)/MCL (ref 0–0.03)
IMM GRANULOCYTES NFR BLD AUTO: 0.2 % (ref 0–0.5)
LYMPHOCYTES # BLD AUTO: 4.43 X10(3)/MCL (ref 1.16–3.74)
LYMPHOCYTES NFR BLD AUTO: 68 % (ref 20–55)
MCH RBC QN AUTO: 25.7 PG (ref 27–34)
MCHC RBC AUTO-ENTMCNC: 31.8 G/DL (ref 31–37)
MCV RBC AUTO: 80.8 FL (ref 79–99)
MONOCYTES # BLD AUTO: 0.42 X10(3)/MCL (ref 0.24–0.36)
MONOCYTES NFR BLD AUTO: 6.5 % (ref 4.7–12.5)
NEUTROPHILS # BLD AUTO: 1.46 X10(3)/MCL (ref 1.56–6.13)
NEUTROPHILS NFR BLD AUTO: 22.4 % (ref 37–73)
NRBC BLD AUTO-RTO: 0 %
PLATELET # BLD AUTO: 268 X10(3)/MCL (ref 140–371)
PMV BLD AUTO: 9 FL (ref 9.4–12.4)
POCT GLUCOSE: 116 MG/DL (ref 70–110)
POCT GLUCOSE: 169 MG/DL (ref 70–110)
POCT GLUCOSE: 176 MG/DL (ref 70–110)
POCT GLUCOSE: 195 MG/DL (ref 70–110)
POTASSIUM SERPL-SCNC: 4.2 MMOL/L (ref 3.5–5.1)
RBC # BLD AUTO: 4.01 X10(6)/MCL (ref 4–5.1)
SODIUM SERPL-SCNC: 138 MMOL/L (ref 136–145)
WBC # BLD AUTO: 6.51 X10(3)/MCL (ref 4–11.5)

## 2024-07-20 PROCEDURE — 25000003 PHARM REV CODE 250: Performed by: FAMILY MEDICINE

## 2024-07-20 PROCEDURE — 25000003 PHARM REV CODE 250: Performed by: INTERNAL MEDICINE

## 2024-07-20 PROCEDURE — 94761 N-INVAS EAR/PLS OXIMETRY MLT: CPT

## 2024-07-20 PROCEDURE — 63600175 PHARM REV CODE 636 W HCPCS: Performed by: FAMILY MEDICINE

## 2024-07-20 PROCEDURE — 36415 COLL VENOUS BLD VENIPUNCTURE: CPT | Performed by: FAMILY MEDICINE

## 2024-07-20 PROCEDURE — 80048 BASIC METABOLIC PNL TOTAL CA: CPT | Performed by: FAMILY MEDICINE

## 2024-07-20 PROCEDURE — A4216 STERILE WATER/SALINE, 10 ML: HCPCS | Performed by: FAMILY MEDICINE

## 2024-07-20 PROCEDURE — 85025 COMPLETE CBC W/AUTO DIFF WBC: CPT | Performed by: FAMILY MEDICINE

## 2024-07-20 PROCEDURE — 11000001 HC ACUTE MED/SURG PRIVATE ROOM

## 2024-07-20 RX ORDER — L. ACIDOPHILUS/L.BULGARICUS 100MM CELL
1 GRANULES IN PACKET (EA) ORAL 2 TIMES DAILY
Status: DISCONTINUED | OUTPATIENT
Start: 2024-07-20 | End: 2024-07-22 | Stop reason: HOSPADM

## 2024-07-20 RX ADMIN — INSULIN ASPART 2 UNITS: 100 INJECTION, SOLUTION INTRAVENOUS; SUBCUTANEOUS at 04:07

## 2024-07-20 RX ADMIN — ENOXAPARIN SODIUM 30 MG: 30 INJECTION SUBCUTANEOUS at 04:07

## 2024-07-20 RX ADMIN — ACETAMINOPHEN 650 MG: 325 TABLET, FILM COATED ORAL at 08:07

## 2024-07-20 RX ADMIN — CEFTRIAXONE SODIUM 1 G: 1 INJECTION, POWDER, FOR SOLUTION INTRAMUSCULAR; INTRAVENOUS at 04:07

## 2024-07-20 RX ADMIN — SODIUM CHLORIDE, PRESERVATIVE FREE 10 ML: 5 INJECTION INTRAVENOUS at 06:07

## 2024-07-20 RX ADMIN — CARVEDILOL 6.25 MG: 6.25 TABLET, FILM COATED ORAL at 08:07

## 2024-07-20 RX ADMIN — INSULIN ASPART 2 UNITS: 100 INJECTION, SOLUTION INTRAVENOUS; SUBCUTANEOUS at 10:07

## 2024-07-20 RX ADMIN — MUPIROCIN: 20 OINTMENT TOPICAL at 09:07

## 2024-07-20 RX ADMIN — SODIUM CHLORIDE, PRESERVATIVE FREE 10 ML: 5 INJECTION INTRAVENOUS at 05:07

## 2024-07-20 RX ADMIN — METRONIDAZOLE 500 MG: 250 TABLET ORAL at 08:07

## 2024-07-20 RX ADMIN — MUPIROCIN: 20 OINTMENT TOPICAL at 08:07

## 2024-07-20 RX ADMIN — LACTOBACILLUS ACIDOPHILUS / LACTOBACILLUS BULGARICUS 1 EACH: 100 MILLION CFU STRENGTH GRANULES at 08:07

## 2024-07-20 RX ADMIN — ATORVASTATIN CALCIUM 20 MG: 20 TABLET, FILM COATED ORAL at 08:07

## 2024-07-20 RX ADMIN — ESCITALOPRAM OXALATE 10 MG: 10 TABLET ORAL at 08:07

## 2024-07-20 RX ADMIN — METRONIDAZOLE 500 MG: 250 TABLET ORAL at 01:07

## 2024-07-20 RX ADMIN — METRONIDAZOLE 500 MG: 250 TABLET ORAL at 05:07

## 2024-07-20 RX ADMIN — SODIUM CHLORIDE, PRESERVATIVE FREE 10 ML: 5 INJECTION INTRAVENOUS at 12:07

## 2024-07-20 RX ADMIN — ASPIRIN 81 MG 81 MG: 81 TABLET ORAL at 08:07

## 2024-07-20 NOTE — PROGRESS NOTES
Hospital Medicine  Progress Note    Patient Name: Augusta James  MRN: [id number]  Status: IP- Inpatient   Admission Date: 7/17/2024  Length of Stay: 3  Date of Service: 07/20/2024       CC: hospital follow-up for        SUBJECTIVE   89 year old woman with history of DM2, HTN, HLD presented with facial/mandible swelling. Patient apparently started having swelling on Monday and presented to PCP where CT scan was performed. Patient was called with results and placed on abx. 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       MEDICATIONS   Scheduled   aspirin  81 mg Oral Daily    atorvastatin  20 mg Oral QHS    carvediloL  6.25 mg Oral BID    cefTRIAXone (Rocephin) IV (PEDS and ADULTS)  1 g Intravenous Q24H    DAPTOmycin (CUBICIN) IV (PEDS and ADULTS)  350 mg Intravenous Q48H    enoxparin  30 mg Subcutaneous Q24H (prophylaxis, 1700)    EScitalopram oxalate  10 mg Oral Daily    metroNIDAZOLE  500 mg Oral Q8H    mupirocin   Nasal BID    sodium chloride 0.9%  10 mL Intravenous Q6H     Continuous Infusions        PHYSICAL EXAM   VITALS: T 97.8 °F (36.6 °C)   BP (!) 114/49   P (!) 55   RR 18   O2 99 %    GENERAL: Awake and in NAD  LUNGS: CTA B/L  CVS: Normal rate  GI/: Soft, NT, bowel sounds positive.  EXTREMITIES: No peripheral edema  NEURO: AAOx3  PSYCH: Cooperative      LABS   CBC  Recent Labs     07/19/24  0408 07/20/24  0437   WBC 6.27 6.51   RBC 4.12 4.01   HGB 10.5* 10.3*   HCT 33.5* 32.4*   MCV 81.3 80.8   MCH 25.5* 25.7*   MCHC 31.3 31.8   RDW 15.2* 15.4*    268      CHEM  Recent Labs     07/17/24  1525 07/18/24  0505 07/19/24  0408 07/20/24  0437    137 136 138   K 4.3 4.0 4.5 4.2   CO2 26 26 26 26   BUN 27* 23* 28* 26*   CREATININE 0.84 0.84 1.06 0.85   GLUCOSE 231* 140* 184* 123*   CALCIUM 10.3* 10.0 10.3* 10.2   ALBUMIN 4.1 3.6  --   --    GLOBULIN 4.3* 3.5  --   --    ALKPHOS 81 76  --   --    ALT 13 10  --   --    AST 24 19  --   --    BILITOT 0.5 0.2  --   --    CRP 1.70*  --   --   --          MICROBIOLOGY     Microbiology Results (last 7 days)       ** No results found for the last 168 hours. **              DIAGNOSTICS   X-Ray Chest AP Portable  Narrative: EXAMINATION:  Chest one view    CLINICAL HISTORY:  PICC line placement    COMPARISON:  None    FINDINGS:  Left-sided intra PICC line tip projects over the right atrium.  There is no visible pneumothorax.  Lungs are clear.  Cardiac silhouette is normal in size.  There is no pleural effusion.  Impression: Left-sided intra PICC line tip projects over the right atrium.    No acute cardiopulmonary process.    Electronically signed by: Mukesh Rick MD  Date:    07/18/2024  Time:    17:40            ASSESSMENT/PLAN:      *Facial osteomyelitis  - Per ED received call from radiology that previous CT scan showed osteomyelitis  - ED called ID and recommendations were for PICC line withceftriaxone, flagyl  - Admitted for IV abx and PICC line  - PICC placement today  -case mgmt consult for home iv abx  -will cont iv abx  plan daptomycin q 48 hr, rocephin 2 gm q 24 and po flagyl 500mg q8 hr     *DM2  - ISS while in house, goal glucose 140-180     *HTN  - Resumed losartan     *HLD  - Resumed statin              Nj Turner MD  [facility] Medicine

## 2024-07-21 LAB
ANION GAP SERPL CALC-SCNC: 4 MEQ/L (ref 2–13)
BASOPHILS # BLD AUTO: 0.04 X10(3)/MCL (ref 0.01–0.08)
BASOPHILS NFR BLD AUTO: 0.7 % (ref 0.1–1.2)
BUN SERPL-MCNC: 27 MG/DL (ref 7–20)
CALCIUM SERPL-MCNC: 10.1 MG/DL (ref 8.4–10.2)
CHLORIDE SERPL-SCNC: 108 MMOL/L (ref 98–110)
CO2 SERPL-SCNC: 25 MMOL/L (ref 21–32)
CREAT SERPL-MCNC: 0.76 MG/DL (ref 0.66–1.25)
CREAT/UREA NIT SERPL: 36 (ref 12–20)
EOSINOPHIL # BLD AUTO: 0.11 X10(3)/MCL (ref 0.04–0.36)
EOSINOPHIL NFR BLD AUTO: 1.8 % (ref 0.7–7)
ERYTHROCYTE [DISTWIDTH] IN BLOOD BY AUTOMATED COUNT: 15.6 % (ref 11–14.5)
GFR SERPLBLD CREATININE-BSD FMLA CKD-EPI: 75 ML/MIN/1.73/M2
GLUCOSE SERPL-MCNC: 110 MG/DL (ref 70–115)
HCT VFR BLD AUTO: 30.4 % (ref 36–48)
HGB BLD-MCNC: 9.8 G/DL (ref 11.8–16)
IMM GRANULOCYTES # BLD AUTO: 0.01 X10(3)/MCL (ref 0–0.03)
IMM GRANULOCYTES NFR BLD AUTO: 0.2 % (ref 0–0.5)
LYMPHOCYTES # BLD AUTO: 4.35 X10(3)/MCL (ref 1.16–3.74)
LYMPHOCYTES NFR BLD AUTO: 72.5 % (ref 20–55)
MCH RBC QN AUTO: 26 PG (ref 27–34)
MCHC RBC AUTO-ENTMCNC: 32.2 G/DL (ref 31–37)
MCV RBC AUTO: 80.6 FL (ref 79–99)
MONOCYTES # BLD AUTO: 0.39 X10(3)/MCL (ref 0.24–0.36)
MONOCYTES NFR BLD AUTO: 6.5 % (ref 4.7–12.5)
NEUTROPHILS # BLD AUTO: 1.1 X10(3)/MCL (ref 1.56–6.13)
NEUTROPHILS NFR BLD AUTO: 18.3 % (ref 37–73)
NRBC BLD AUTO-RTO: 0 %
PLATELET # BLD AUTO: 266 X10(3)/MCL (ref 140–371)
PMV BLD AUTO: 9.3 FL (ref 9.4–12.4)
POCT GLUCOSE: 108 MG/DL (ref 70–110)
POCT GLUCOSE: 156 MG/DL (ref 70–110)
POCT GLUCOSE: 196 MG/DL (ref 70–110)
POTASSIUM SERPL-SCNC: 4.4 MMOL/L (ref 3.5–5.1)
RBC # BLD AUTO: 3.77 X10(6)/MCL (ref 4–5.1)
SODIUM SERPL-SCNC: 137 MMOL/L (ref 136–145)
WBC # BLD AUTO: 6 X10(3)/MCL (ref 4–11.5)

## 2024-07-21 PROCEDURE — 25000003 PHARM REV CODE 250: Performed by: FAMILY MEDICINE

## 2024-07-21 PROCEDURE — 25000003 PHARM REV CODE 250: Performed by: INTERNAL MEDICINE

## 2024-07-21 PROCEDURE — 36415 COLL VENOUS BLD VENIPUNCTURE: CPT | Performed by: FAMILY MEDICINE

## 2024-07-21 PROCEDURE — 80048 BASIC METABOLIC PNL TOTAL CA: CPT | Performed by: FAMILY MEDICINE

## 2024-07-21 PROCEDURE — 11000001 HC ACUTE MED/SURG PRIVATE ROOM

## 2024-07-21 PROCEDURE — 85025 COMPLETE CBC W/AUTO DIFF WBC: CPT | Performed by: FAMILY MEDICINE

## 2024-07-21 PROCEDURE — 63600175 PHARM REV CODE 636 W HCPCS: Performed by: FAMILY MEDICINE

## 2024-07-21 PROCEDURE — A4216 STERILE WATER/SALINE, 10 ML: HCPCS | Performed by: FAMILY MEDICINE

## 2024-07-21 PROCEDURE — 94761 N-INVAS EAR/PLS OXIMETRY MLT: CPT

## 2024-07-21 RX ORDER — ENOXAPARIN SODIUM 100 MG/ML
40 INJECTION SUBCUTANEOUS EVERY 24 HOURS
Status: DISCONTINUED | OUTPATIENT
Start: 2024-07-21 | End: 2024-07-22 | Stop reason: HOSPADM

## 2024-07-21 RX ADMIN — SODIUM CHLORIDE, PRESERVATIVE FREE 10 ML: 5 INJECTION INTRAVENOUS at 05:07

## 2024-07-21 RX ADMIN — METRONIDAZOLE 500 MG: 250 TABLET ORAL at 05:07

## 2024-07-21 RX ADMIN — ESCITALOPRAM OXALATE 10 MG: 10 TABLET ORAL at 08:07

## 2024-07-21 RX ADMIN — INSULIN ASPART 2 UNITS: 100 INJECTION, SOLUTION INTRAVENOUS; SUBCUTANEOUS at 04:07

## 2024-07-21 RX ADMIN — ATORVASTATIN CALCIUM 20 MG: 20 TABLET, FILM COATED ORAL at 08:07

## 2024-07-21 RX ADMIN — CARVEDILOL 6.25 MG: 6.25 TABLET, FILM COATED ORAL at 08:07

## 2024-07-21 RX ADMIN — MUPIROCIN: 20 OINTMENT TOPICAL at 08:07

## 2024-07-21 RX ADMIN — ASPIRIN 81 MG 81 MG: 81 TABLET ORAL at 08:07

## 2024-07-21 RX ADMIN — LACTOBACILLUS ACIDOPHILUS / LACTOBACILLUS BULGARICUS 1 EACH: 100 MILLION CFU STRENGTH GRANULES at 08:07

## 2024-07-21 RX ADMIN — DAPTOMYCIN 350 MG: 500 INJECTION, POWDER, LYOPHILIZED, FOR SOLUTION INTRAVENOUS at 01:07

## 2024-07-21 RX ADMIN — MUPIROCIN: 20 OINTMENT TOPICAL at 09:07

## 2024-07-21 RX ADMIN — SODIUM CHLORIDE, PRESERVATIVE FREE 10 ML: 5 INJECTION INTRAVENOUS at 12:07

## 2024-07-21 RX ADMIN — CEFTRIAXONE SODIUM 1 G: 1 INJECTION, POWDER, FOR SOLUTION INTRAMUSCULAR; INTRAVENOUS at 04:07

## 2024-07-21 RX ADMIN — METRONIDAZOLE 500 MG: 250 TABLET ORAL at 08:07

## 2024-07-21 RX ADMIN — METRONIDAZOLE 500 MG: 250 TABLET ORAL at 01:07

## 2024-07-21 RX ADMIN — INSULIN ASPART 2 UNITS: 100 INJECTION, SOLUTION INTRAVENOUS; SUBCUTANEOUS at 10:07

## 2024-07-21 RX ADMIN — ENOXAPARIN SODIUM 40 MG: 40 INJECTION SUBCUTANEOUS at 04:07

## 2024-07-21 NOTE — PROGRESS NOTES
Hospital Medicine  Progress Note    Patient Name: Augusta James  MRN: [id number]  Status: IP- Inpatient   Admission Date: 7/17/2024  Length of Stay: 4  Date of Service: 07/21/2024       CC: hospital follow-up for        SUBJECTIVE   89 year old woman with history of DM2, HTN, HLD presented with facial/mandible swelling. Patient apparently started having swelling on Monday and presented to PCP where CT scan was performed. Patient was called with results and placed on abx. 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       MEDICATIONS   Scheduled   aspirin  81 mg Oral Daily    atorvastatin  20 mg Oral QHS    carvediloL  6.25 mg Oral BID    cefTRIAXone (Rocephin) IV (PEDS and ADULTS)  1 g Intravenous Q24H    DAPTOmycin (CUBICIN) IV (PEDS and ADULTS)  350 mg Intravenous Q48H    enoxparin  40 mg Subcutaneous Q24H (prophylaxis, 1700)    EScitalopram oxalate  10 mg Oral Daily    lactobacillus acidophilus & bulgar  1 packet Oral BID    metroNIDAZOLE  500 mg Oral Q8H    mupirocin   Nasal BID    sodium chloride 0.9%  10 mL Intravenous Q6H     Continuous Infusions        PHYSICAL EXAM   VITALS: T 98 °F (36.7 °C)   /68   P (!) 54   RR 18   O2 98 %    GENERAL: Awake and in NAD  LUNGS: CTA B/L  CVS: Normal rate  GI/: Soft, NT, bowel sounds positive.  EXTREMITIES: No peripheral edema  NEURO: AAOx3  PSYCH: Cooperative      LABS   CBC  Recent Labs     07/20/24  0437 07/21/24  0444   WBC 6.51 6.00   RBC 4.01 3.77*   HGB 10.3*  9.8*   HCT 32.4* 30.4*   MCV 80.8 80.6   MCH 25.7* 26.0*   MCHC 31.8 32.2   RDW 15.4* 15.6*    266     CHEM  Recent Labs     07/20/24  0437 07/21/24  0444    137   K 4.2 4.4   CO2 26 25   BUN 26* 27*   CREATININE 0.85 0.76   GLUCOSE 123* 110   CALCIUM 10.2 10.1         MICROBIOLOGY     Microbiology Results (last 7 days)       ** No results found for the last 168 hours. **              DIAGNOSTICS   X-Ray Chest AP Portable  Narrative: EXAMINATION:  Chest one view    CLINICAL HISTORY:  PICC line placement    COMPARISON:  None    FINDINGS:  Left-sided intra PICC line tip projects over the right atrium.  There is no visible pneumothorax.  Lungs are clear.  Cardiac silhouette is normal in size.  There is no pleural effusion.  Impression: Left-sided intra PICC line tip projects over the right atrium.    No acute cardiopulmonary process.    Electronically signed by: Mukesh Rick MD  Date:    07/18/2024  Time:    17:40        ASSESSMENT/PLAN:     *Facial osteomyelitis  - Per ED received call from radiology that previous CT scan showed osteomyelitis  - ED called ID and recommendations were for PICC line withceftriaxone, flagyl  - Admitted for IV abx and PICC line  - PICC placement today  -case mgmt consult for home iv abx  -will cont iv abx  plan daptomycin q 48 hr, rocephin 2 gm q 24 and po flagyl 500mg q8 hr     *DM2  - ISS while in house, goal glucose 140-180     *HTN  - Resumed losartan     *HLD  - Resumed statin           Nj Turner MD  [facility] Medicine

## 2024-07-22 VITALS
WEIGHT: 97.38 LBS | HEIGHT: 55 IN | TEMPERATURE: 99 F | BODY MASS INDEX: 22.54 KG/M2 | DIASTOLIC BLOOD PRESSURE: 49 MMHG | HEART RATE: 62 BPM | RESPIRATION RATE: 18 BRPM | SYSTOLIC BLOOD PRESSURE: 124 MMHG | OXYGEN SATURATION: 100 %

## 2024-07-22 LAB
ANION GAP SERPL CALC-SCNC: 5 MEQ/L (ref 2–13)
BASOPHILS # BLD AUTO: 0.05 X10(3)/MCL (ref 0.01–0.08)
BASOPHILS NFR BLD AUTO: 0.7 % (ref 0.1–1.2)
BUN SERPL-MCNC: 29 MG/DL (ref 7–20)
CALCIUM SERPL-MCNC: 10 MG/DL (ref 8.4–10.2)
CHLORIDE SERPL-SCNC: 107 MMOL/L (ref 98–110)
CK SERPL-CCNC: 48 U/L (ref 30–135)
CO2 SERPL-SCNC: 25 MMOL/L (ref 21–32)
CREAT SERPL-MCNC: 0.85 MG/DL (ref 0.66–1.25)
CREAT/UREA NIT SERPL: 34 (ref 12–20)
EOSINOPHIL # BLD AUTO: 0.1 X10(3)/MCL (ref 0.04–0.36)
EOSINOPHIL NFR BLD AUTO: 1.3 % (ref 0.7–7)
ERYTHROCYTE [DISTWIDTH] IN BLOOD BY AUTOMATED COUNT: 15.8 % (ref 11–14.5)
GFR SERPLBLD CREATININE-BSD FMLA CKD-EPI: 65 ML/MIN/1.73/M2
GLUCOSE SERPL-MCNC: 114 MG/DL (ref 70–115)
HCT VFR BLD AUTO: 32.4 % (ref 36–48)
HGB BLD-MCNC: 10.3 G/DL (ref 11.8–16)
IMM GRANULOCYTES # BLD AUTO: 0.01 X10(3)/MCL (ref 0–0.03)
IMM GRANULOCYTES NFR BLD AUTO: 0.1 % (ref 0–0.5)
LYMPHOCYTES # BLD AUTO: 5.3 X10(3)/MCL (ref 1.16–3.74)
LYMPHOCYTES NFR BLD AUTO: 71 % (ref 20–55)
MCH RBC QN AUTO: 25.8 PG (ref 27–34)
MCHC RBC AUTO-ENTMCNC: 31.8 G/DL (ref 31–37)
MCV RBC AUTO: 81 FL (ref 79–99)
MONOCYTES # BLD AUTO: 0.45 X10(3)/MCL (ref 0.24–0.36)
MONOCYTES NFR BLD AUTO: 6 % (ref 4.7–12.5)
NEUTROPHILS # BLD AUTO: 1.56 X10(3)/MCL (ref 1.56–6.13)
NEUTROPHILS NFR BLD AUTO: 20.9 % (ref 37–73)
NRBC BLD AUTO-RTO: 0 %
PLATELET # BLD AUTO: 257 X10(3)/MCL (ref 140–371)
PMV BLD AUTO: 8.6 FL (ref 9.4–12.4)
POCT GLUCOSE: 109 MG/DL (ref 70–110)
POTASSIUM SERPL-SCNC: 4.3 MMOL/L (ref 3.5–5.1)
RBC # BLD AUTO: 4 X10(6)/MCL (ref 4–5.1)
SODIUM SERPL-SCNC: 137 MMOL/L (ref 136–145)
WBC # BLD AUTO: 7.47 X10(3)/MCL (ref 4–11.5)

## 2024-07-22 PROCEDURE — 25000003 PHARM REV CODE 250: Performed by: INTERNAL MEDICINE

## 2024-07-22 PROCEDURE — 63600175 PHARM REV CODE 636 W HCPCS: Performed by: FAMILY MEDICINE

## 2024-07-22 PROCEDURE — A4216 STERILE WATER/SALINE, 10 ML: HCPCS | Performed by: FAMILY MEDICINE

## 2024-07-22 PROCEDURE — 85025 COMPLETE CBC W/AUTO DIFF WBC: CPT | Performed by: FAMILY MEDICINE

## 2024-07-22 PROCEDURE — 94761 N-INVAS EAR/PLS OXIMETRY MLT: CPT

## 2024-07-22 PROCEDURE — 36415 COLL VENOUS BLD VENIPUNCTURE: CPT | Performed by: FAMILY MEDICINE

## 2024-07-22 PROCEDURE — 80048 BASIC METABOLIC PNL TOTAL CA: CPT | Performed by: FAMILY MEDICINE

## 2024-07-22 PROCEDURE — 82550 ASSAY OF CK (CPK): CPT | Performed by: FAMILY MEDICINE

## 2024-07-22 PROCEDURE — 25000003 PHARM REV CODE 250: Performed by: FAMILY MEDICINE

## 2024-07-22 RX ORDER — CEFTRIAXONE 1 G/1
2 INJECTION, POWDER, FOR SOLUTION INTRAMUSCULAR; INTRAVENOUS DAILY
Status: ON HOLD
Start: 2024-07-22

## 2024-07-22 RX ORDER — METRONIDAZOLE 500 MG/1
500 TABLET ORAL EVERY 8 HOURS
Qty: 126 TABLET | Refills: 0 | Status: ON HOLD | OUTPATIENT
Start: 2024-07-22 | End: 2024-09-02

## 2024-07-22 RX ORDER — L. ACIDOPHILUS/L.BULGARICUS 100MM CELL
1 GRANULES IN PACKET (EA) ORAL 2 TIMES DAILY
Qty: 60 PACKET | Refills: 0 | Status: ON HOLD | OUTPATIENT
Start: 2024-07-22 | End: 2024-08-21

## 2024-07-22 RX ORDER — METRONIDAZOLE 500 MG/1
500 TABLET ORAL EVERY 8 HOURS
Qty: 126 TABLET | Refills: 0 | Status: SHIPPED | OUTPATIENT
Start: 2024-07-22 | End: 2024-07-22

## 2024-07-22 RX ADMIN — ASPIRIN 81 MG 81 MG: 81 TABLET ORAL at 09:07

## 2024-07-22 RX ADMIN — SODIUM CHLORIDE, PRESERVATIVE FREE 10 ML: 5 INJECTION INTRAVENOUS at 12:07

## 2024-07-22 RX ADMIN — CARVEDILOL 6.25 MG: 6.25 TABLET, FILM COATED ORAL at 09:07

## 2024-07-22 RX ADMIN — MUPIROCIN: 20 OINTMENT TOPICAL at 09:07

## 2024-07-22 RX ADMIN — METRONIDAZOLE 500 MG: 250 TABLET ORAL at 01:07

## 2024-07-22 RX ADMIN — ESCITALOPRAM OXALATE 10 MG: 10 TABLET ORAL at 09:07

## 2024-07-22 RX ADMIN — METRONIDAZOLE 500 MG: 250 TABLET ORAL at 05:07

## 2024-07-22 RX ADMIN — CEFTRIAXONE SODIUM 1 G: 1 INJECTION, POWDER, FOR SOLUTION INTRAMUSCULAR; INTRAVENOUS at 02:07

## 2024-07-22 RX ADMIN — SODIUM CHLORIDE, PRESERVATIVE FREE 10 ML: 5 INJECTION INTRAVENOUS at 06:07

## 2024-07-22 RX ADMIN — LACTOBACILLUS ACIDOPHILUS / LACTOBACILLUS BULGARICUS 1 EACH: 100 MILLION CFU STRENGTH GRANULES at 09:07

## 2024-07-22 RX ADMIN — INSULIN ASPART 6 UNITS: 100 INJECTION, SOLUTION INTRAVENOUS; SUBCUTANEOUS at 12:07

## 2024-07-22 NOTE — DISCHARGE SUMMARY
Hospital Medicine  Discharge Summary    Patient Name: Augusta James  MRN: [id number]  Admit Date: 7/17/2024  Discharge Date:  07/22/2024  Status: IP- Inpatient   Length of Stay: 5      PHYSICIANS   Admitting Physician: Maninder Salvador MD  Discharging Physician: Nj Turner MD.  Primary Care Physician: Capo Kelly MD        DISCHARGE DIAGNOSES   *Facial osteomyelitis   *DM2  - ISS while in house, goal glucose 140-180     *HTN  - Resumed losartan     *HLD  - Resumed statin     PROCEDURES   * No surgery found *         HOSPITAL COURSE    89 year old woman with history of DM2, HTN, HLD presented with facial/mandible swelling. Patient apparently started having swelling on Monday and presented to PCP where CT scan was performed. Patient was called with results and placed on abx. CT results reported mandibular osteomyelitis and patient presented to ED     ED course; ID consulted and recommended PICC line and IV abx: vancomycin/ceftriaxone/flagyl      07/18/2024  No new c/o  Lab stable   Plan PICC line today  Cont iv abx  Consult case mgmt for home iv abx      07/19/2024  Awaiting insurance approval for home iv abx for osteo     07/20/2024  No new c/o  Received daptomycin yesterday first dose q 48 hr       07/21/2024  Awaiting home iv abx approval   No c/o     Approved   D/c home today  Margaret for transfusion company to monitor [facility] for daptomycin adjustment 24-48 hr      STATUS  ImprovedStable    DISPOSITION  Discharge to home    DIET  diabetic    ACTIVITY  As tolerated      FOLLOW-UP      Contact information for after-discharge care       Dialysis/Infusion       [facility] .    Service: Home Infusion and Injection  Contact information:  [address]  [facility] [address]  [facility] [address]  [phone]                                     DISCHARGE MEDICATION RECONCILIATION        Medication List        START taking these medications      0.9% NaCl SolP 50 mL with DAPTOmycin 500 mg SolR  350 mg  Inject 350 mg into the vein every 48 hours. X 6 weeks  Start taking on: July 23, 2024     cefTRIAXone 1 gram injection  Commonly known as: Rocephin  Inject 2 g into the vein once daily. X 6 week s     lactobacillus acidophilus & bulgar 100 million cell packet  Commonly known as: LACTINEX  Take 1 packet (1 each total) by mouth 2 (two) times daily.     metroNIDAZOLE 500 MG tablet  Commonly known as: FLAGYL  Take 1 tablet (500 mg total) by mouth every 8 (eight) hours.            CONTINUE taking these medications      alendronate 70 MG tablet  Commonly known as: FOSAMAX     aspirin 81 mg Cap     carvediloL 6.25 MG tablet  Commonly known as: COREG     dapagliflozin propanediol 5 mg Tab tablet  Commonly known as: Farxiga     EScitalopram oxalate 10 MG tablet  Commonly known as: LEXAPRO     hydroCHLOROthiazide 25 MG tablet  Commonly known as: HYDRODIURIL     lovastatin 20 MG tablet  Commonly known as: MEVACOR     metFORMIN 500 MG ER 24hr tablet  Commonly known as: GLUCOPHAGE-XR            STOP taking these medications      amoxicillin 875 MG tablet  Commonly known as: AMOXIL     doxycycline 100 MG Cap  Commonly known as: VIBRAMYCIN               Where to Get Your Medications        These medications were sent to [facility] [place]  [place] [address]      Phone: [phone]   lactobacillus acidophilus & bulgar 100 million cell packet  metroNIDAZOLE 500 MG tablet       Information about where to get these medications is not yet available    Ask your nurse or doctor about these medications  0.9% NaCl SolP 50 mL with DAPTOmycin 500 mg SolR 350 mg  cefTRIAXone 1 gram injection           PHYSICAL EXAM   VITALS: T 98.6 °F (37 °C)   BP (!) 119/43   P (!) 57   RR 18   O2 100 %    GENERAL: Awake and in NAD  LUNGS: CTA B/L  CVS: Normal rate  GI/: Soft, NT, bowel sounds positive.  EXTREMITIES: No peripheral edema  NEURO: AAOx3  PSYCH: Cooperative        DIAGNOSITCS  "  CBC:   Recent Labs   Lab 07/20/24  0437 07/21/24  0444 07/22/24  0420   WBC 6.51 6.00 7.47   HGB 10.3* 9.8* 10.3*   HCT 32.4* 30.4* 32.4*    266 257       CMP:   Recent Labs   Lab 07/17/24  1525 07/18/24  0505 07/19/24  0408 07/20/24  0437 07/21/24  0444 07/22/24  0420   CALCIUM 10.3* 10.0   < > 10.2 10.1 10.0   ALBUMIN 4.1 3.6  --   --   --   --     137   < > 138 137 137   K 4.3 4.0   < > 4.2 4.4 4.3   CO2 26 26   < > 26 25 25    103   < > 106 108 107   BUN 27* 23*   < > 26* 27* 29*   CREATININE 0.84 0.84   < > 0.85 0.76 0.85   ALKPHOS 81 76  --   --   --   --    ALT 13 10  --   --   --   --    AST 24 19  --   --   --   --    BILITOT 0.5 0.2  --   --   --   --     < > = values in this interval not displayed.     Estimated Creatinine Clearance: 29.4 mL/min (based on SCr of 0.85 mg/dL).    Labs within the past 24 hours have been reviewed.     COAG:  No results for input(s): "APTT", "INR", "PTT" in the last 168 hours.    CARDIAC ENZYMES:   Recent Labs     07/22/24  0420   CPK 48        No results for input(s): "AMYLASE", "LIPASE" in the last 168 hours.    Recent Labs     07/20/24  1554 07/20/24  2017 07/21/24  0703 07/21/24  1054 07/21/24  1537 07/22/24  0730   POCTGLUCOSE 169* 195* 108 196* 156* 109        Microbiology Results (last 7 days)       ** No results found for the last 168 hours. **             No results for input(s): "CHOL", "TRIG", "HDL", "LDL" in the last 72 hours. No results found for: "LABA1C", "HGBA1C"     X-Ray Chest AP Portable    Result Date: 7/18/2024  EXAMINATION: Chest one view CLINICAL HISTORY: PICC line placement COMPARISON: None FINDINGS: Left-sided intra PICC line tip projects over the right atrium.  There is no visible pneumothorax.  Lungs are clear.  Cardiac silhouette is normal in size.  There is no pleural effusion.     Left-sided intra PICC line tip projects over the right atrium. No acute cardiopulmonary process. Electronically signed by: Mukesh Rick MD " Date:    07/18/2024 Time:    17:40      N/A     Patient Screened for food insecurity, housing instability, transportation needs, utility difficulties, and interpersonal safety.  No needs identified    Discharge time: 33 minutes         Nj Turner MD  [facility]

## 2024-07-22 NOTE — PLAN OF CARE
07/22/24 1147   Final Note   Assessment Type Final Discharge Note   Anticipated Discharge Disposition Home-Health  ([facility] with 1st Option IV Infusion)   What phone number can be called within the next 1-3 days to see how you are doing after discharge? [phone]   Post-Acute Status   Post-Acute Authorization Home Health;IV Infusion   Home Health Status Set-up Complete/Auth obtained   Coverage [employer] [facility]   IV Infusion Status Set-up Complete/Auth obtained   Discharge Delays None known at this time

## 2024-07-22 NOTE — DISCHARGE INSTRUCTIONS
WOUND CARE; CHIN CLEANSE WITH VASHE AND APPLY SILVER AQUACEL AND COVER WITH FOAM DRESSING EVERY 3 DAYS DUE 7/24/2024

## 2024-07-22 NOTE — PLAN OF CARE
Physician ordered to discharge patient to home. Pt's home health care provider ( [facility] ) was notified per phone [phone] / fax [phone] of pt's discharge, spoke with [facility] nurse to resume home visits.

## 2024-07-22 NOTE — PLAN OF CARE
07/22/24 1306   Medicare Message   Important Message from Medicare regarding Discharge Appeal Rights Given to patient/caregiver;Explained to patient/caregiver;Signed/date by patient/caregiver   Date IMM was signed 07/18/24

## 2024-07-23 ENCOUNTER — HOSPITAL ENCOUNTER (INPATIENT)
Facility: HOSPITAL | Age: 89
LOS: 7 days | Discharge: NURSING, REHAB OR LONG-TERM CARE | DRG: 158 | End: 2024-07-30
Attending: INTERNAL MEDICINE | Admitting: INTERNAL MEDICINE
Payer: COMMERCIAL

## 2024-07-23 ENCOUNTER — PATIENT OUTREACH (OUTPATIENT)
Dept: ADMINISTRATIVE | Facility: CLINIC | Age: 89
End: 2024-07-23
Payer: COMMERCIAL

## 2024-07-23 DIAGNOSIS — L89.159 PRESSURE INJURY OF SKIN OF SACRAL REGION, UNSPECIFIED INJURY STAGE: ICD-10-CM

## 2024-07-23 DIAGNOSIS — M27.2 OSTEOMYELITIS OF JAW: Primary | ICD-10-CM

## 2024-07-23 DIAGNOSIS — R19.7 DIARRHEA, UNSPECIFIED TYPE: ICD-10-CM

## 2024-07-23 LAB
ALBUMIN SERPL-MCNC: 3.3 G/DL (ref 3.4–4.8)
ALBUMIN/GLOB SERPL: 0.7 RATIO (ref 1.1–2)
ALP SERPL-CCNC: 53 UNIT/L (ref 40–150)
ALT SERPL-CCNC: 19 UNIT/L (ref 0–55)
ANION GAP SERPL CALC-SCNC: 10 MEQ/L
AST SERPL-CCNC: 29 UNIT/L (ref 5–34)
BASOPHILS # BLD AUTO: 0.05 X10(3)/MCL
BASOPHILS NFR BLD AUTO: 0.6 %
BILIRUB SERPL-MCNC: 0.2 MG/DL
BUN SERPL-MCNC: 29 MG/DL (ref 9.8–20.1)
CALCIUM SERPL-MCNC: 10.6 MG/DL (ref 8.4–10.2)
CHLORIDE SERPL-SCNC: 106 MMOL/L (ref 98–111)
CO2 SERPL-SCNC: 23 MMOL/L (ref 23–31)
CREAT SERPL-MCNC: 1.07 MG/DL (ref 0.55–1.02)
CREAT/UREA NIT SERPL: 27
EOSINOPHIL # BLD AUTO: 0.12 X10(3)/MCL (ref 0–0.9)
EOSINOPHIL NFR BLD AUTO: 1.4 %
ERYTHROCYTE [DISTWIDTH] IN BLOOD BY AUTOMATED COUNT: 16.6 % (ref 11.5–17)
GFR SERPLBLD CREATININE-BSD FMLA CKD-EPI: 49 ML/MIN/1.73/M2
GLOBULIN SER-MCNC: 4.6 GM/DL (ref 2.4–3.5)
GLUCOSE SERPL-MCNC: 168 MG/DL (ref 75–121)
HCT VFR BLD AUTO: 31.9 % (ref 37–47)
HGB BLD-MCNC: 9.9 G/DL (ref 12–16)
IMM GRANULOCYTES # BLD AUTO: 0.02 X10(3)/MCL (ref 0–0.04)
IMM GRANULOCYTES NFR BLD AUTO: 0.2 %
LACTATE SERPL-SCNC: 0.8 MMOL/L (ref 0.5–2.2)
LYMPHOCYTES # BLD AUTO: 4.02 X10(3)/MCL (ref 0.6–4.6)
LYMPHOCYTES NFR BLD AUTO: 48.4 %
MCH RBC QN AUTO: 26 PG (ref 27–31)
MCHC RBC AUTO-ENTMCNC: 31 G/DL (ref 33–36)
MCV RBC AUTO: 83.7 FL (ref 80–94)
MONOCYTES # BLD AUTO: 0.53 X10(3)/MCL (ref 0.1–1.3)
MONOCYTES NFR BLD AUTO: 6.4 %
NEUTROPHILS # BLD AUTO: 3.56 X10(3)/MCL (ref 2.1–9.2)
NEUTROPHILS NFR BLD AUTO: 43 %
PLATELET # BLD AUTO: 267 X10(3)/MCL (ref 130–400)
PMV BLD AUTO: 9.1 FL (ref 7.4–10.4)
POCT GLUCOSE: 145 MG/DL (ref 70–110)
POCT GLUCOSE: 256 MG/DL (ref 70–110)
POTASSIUM SERPL-SCNC: 4.3 MMOL/L (ref 3.5–5.1)
PROT SERPL-MCNC: 7.9 GM/DL (ref 5.8–7.6)
RBC # BLD AUTO: 3.81 X10(6)/MCL (ref 4.2–5.4)
SODIUM SERPL-SCNC: 139 MMOL/L (ref 136–145)
WBC # BLD AUTO: 8.3 X10(3)/MCL (ref 4.5–11.5)

## 2024-07-23 PROCEDURE — 63600175 PHARM REV CODE 636 W HCPCS: Performed by: INTERNAL MEDICINE

## 2024-07-23 PROCEDURE — 94761 N-INVAS EAR/PLS OXIMETRY MLT: CPT

## 2024-07-23 PROCEDURE — 25000003 PHARM REV CODE 250: Performed by: INTERNAL MEDICINE

## 2024-07-23 PROCEDURE — 85025 COMPLETE CBC W/AUTO DIFF WBC: CPT

## 2024-07-23 PROCEDURE — 11000001 HC ACUTE MED/SURG PRIVATE ROOM

## 2024-07-23 PROCEDURE — 83605 ASSAY OF LACTIC ACID: CPT

## 2024-07-23 PROCEDURE — 87040 BLOOD CULTURE FOR BACTERIA: CPT

## 2024-07-23 PROCEDURE — 86141 C-REACTIVE PROTEIN HS: CPT

## 2024-07-23 PROCEDURE — 27000207 HC ISOLATION

## 2024-07-23 PROCEDURE — 80053 COMPREHEN METABOLIC PANEL: CPT

## 2024-07-23 RX ORDER — HYDRALAZINE HYDROCHLORIDE 20 MG/ML
10 INJECTION INTRAMUSCULAR; INTRAVENOUS EVERY 6 HOURS PRN
Status: DISCONTINUED | OUTPATIENT
Start: 2024-07-23 | End: 2024-07-30 | Stop reason: HOSPADM

## 2024-07-23 RX ORDER — MUPIROCIN 20 MG/G
OINTMENT TOPICAL 2 TIMES DAILY
Status: COMPLETED | OUTPATIENT
Start: 2024-07-23 | End: 2024-07-28

## 2024-07-23 RX ORDER — INSULIN ASPART 100 [IU]/ML
0-5 INJECTION, SOLUTION INTRAVENOUS; SUBCUTANEOUS
Status: DISCONTINUED | OUTPATIENT
Start: 2024-07-23 | End: 2024-07-30 | Stop reason: HOSPADM

## 2024-07-23 RX ORDER — TALC
6 POWDER (GRAM) TOPICAL NIGHTLY PRN
Status: DISCONTINUED | OUTPATIENT
Start: 2024-07-23 | End: 2024-07-30 | Stop reason: HOSPADM

## 2024-07-23 RX ORDER — IBUPROFEN 200 MG
24 TABLET ORAL
Status: DISCONTINUED | OUTPATIENT
Start: 2024-07-23 | End: 2024-07-30 | Stop reason: HOSPADM

## 2024-07-23 RX ORDER — IBUPROFEN 200 MG
16 TABLET ORAL
Status: DISCONTINUED | OUTPATIENT
Start: 2024-07-23 | End: 2024-07-30 | Stop reason: HOSPADM

## 2024-07-23 RX ORDER — CARVEDILOL 6.25 MG/1
6.25 TABLET ORAL 2 TIMES DAILY
Status: DISCONTINUED | OUTPATIENT
Start: 2024-07-23 | End: 2024-07-30 | Stop reason: HOSPADM

## 2024-07-23 RX ORDER — SODIUM CHLORIDE 0.9 % (FLUSH) 0.9 %
10 SYRINGE (ML) INJECTION
Status: DISCONTINUED | OUTPATIENT
Start: 2024-07-23 | End: 2024-07-30 | Stop reason: HOSPADM

## 2024-07-23 RX ORDER — L. ACIDOPHILUS/L.BULGARICUS 100MM CELL
1 GRANULES IN PACKET (EA) ORAL 2 TIMES DAILY
Status: DISCONTINUED | OUTPATIENT
Start: 2024-07-23 | End: 2024-07-30 | Stop reason: HOSPADM

## 2024-07-23 RX ORDER — CEFTRIAXONE 2 G/1
2 INJECTION, POWDER, FOR SOLUTION INTRAMUSCULAR; INTRAVENOUS DAILY
Status: DISCONTINUED | OUTPATIENT
Start: 2024-07-23 | End: 2024-07-24

## 2024-07-23 RX ORDER — GLUCAGON 1 MG
1 KIT INJECTION
Status: DISCONTINUED | OUTPATIENT
Start: 2024-07-23 | End: 2024-07-30 | Stop reason: HOSPADM

## 2024-07-23 RX ORDER — NAPROXEN SODIUM 220 MG/1
81 TABLET, FILM COATED ORAL DAILY
Status: DISCONTINUED | OUTPATIENT
Start: 2024-07-24 | End: 2024-07-30 | Stop reason: HOSPADM

## 2024-07-23 RX ORDER — METRONIDAZOLE 250 MG/1
500 TABLET ORAL EVERY 8 HOURS
Status: DISCONTINUED | OUTPATIENT
Start: 2024-07-23 | End: 2024-07-30 | Stop reason: HOSPADM

## 2024-07-23 RX ADMIN — METRONIDAZOLE 500 MG: 250 TABLET ORAL at 09:07

## 2024-07-23 RX ADMIN — CEFTRIAXONE SODIUM 2 G: 2 INJECTION, POWDER, FOR SOLUTION INTRAMUSCULAR; INTRAVENOUS at 09:07

## 2024-07-23 RX ADMIN — MUPIROCIN 1 G: 20 OINTMENT TOPICAL at 09:07

## 2024-07-23 RX ADMIN — Medication 1 EACH: at 09:07

## 2024-07-23 RX ADMIN — CARVEDILOL 6.25 MG: 6.25 TABLET, FILM COATED ORAL at 09:07

## 2024-07-23 NOTE — Clinical Note
Diagnosis: Osteomyelitis of jaw [919127]   Future Attending Provider: KAMERON KRAUS [[id number]]   Reason for IP Medical Treatment  (Clinical interventions that can only be accomplished in the IP setting? ) :: IV antibiotics   I certify that Inpatient services for greater than or equal to 2 midnights are medically necessary:: Yes   Plans for Post-Acute care--if anticipated (pick the single best option):: A. No post acute care anticipated at this time

## 2024-07-23 NOTE — PROGRESS NOTES
C3 nurse spoke with Augusta James  for a TCC post hospital discharge follow up call. The patient has a scheduled [facility] appointment with Capo Kelly MD (Family Medicine); 7/29/2024 @ 1030 AM       
No

## 2024-07-23 NOTE — ED PROVIDER NOTES
Encounter Date: 7/23/2024       History     Chief Complaint   Patient presents with    cannot manage her abx     Unable to manage her IV abx   Home health wants pt admitted so she can go to LTAC     See MDM.     The history is provided by the patient, a relative and a caregiver. No  was used.     Review of patient's allergies indicates:  No Known Allergies  Past Medical History:   Diagnosis Date    Arthritis     Diabetes mellitus     Hypertension      Past Surgical History:   Procedure Laterality Date    EYE SURGERY      HYSTERECTOMY      TONSILLECTOMY       No family history on file.  Social History     Tobacco Use    Smoking status: Never    Smokeless tobacco: Never   Substance Use Topics    Alcohol use: Never    Drug use: Never     Review of Systems   Constitutional:  Negative for chills and fever.   HENT:  Positive for facial swelling.    Respiratory:  Negative for shortness of breath.    Cardiovascular:  Negative for chest pain.   Gastrointestinal:  Positive for diarrhea. Negative for abdominal pain, constipation, nausea and vomiting.   Genitourinary:  Negative for dysuria, flank pain and hematuria.   All other systems reviewed and are negative.      Physical Exam     Initial Vitals [07/23/24 1625]   BP Pulse Resp Temp SpO2   (!) 165/70 76 16 97.9 °F (36.6 °C) 98 %      MAP       --         Physical Exam    Nursing note and vitals reviewed.  Constitutional: She appears well-developed and well-nourished. She is not diaphoretic. No distress.   HENT:   Head: Normocephalic and atraumatic.   Cardiovascular:  Normal rate, regular rhythm, normal heart sounds and intact distal pulses.     Exam reveals no gallop and no friction rub.       No murmur heard.  Pulmonary/Chest: Breath sounds normal. No respiratory distress. She has no wheezes. She has no rhonchi. She has no rales.   Abdominal: Abdomen is soft. Bowel sounds are normal.   Musculoskeletal:         General: Normal range of motion.      Neurological: She is alert and oriented to person, place, and time.   Skin: Skin is warm and dry.   Psychiatric: She has a normal mood and affect. Her behavior is normal.         ED Course   Procedures  Labs Reviewed   COMPREHENSIVE METABOLIC PANEL - Abnormal       Result Value    Sodium 139      Potassium 4.3      Chloride 106      CO2 23      Glucose 168 (*)     Blood Urea Nitrogen 29.0 (*)     Creatinine 1.07 (*)     Calcium 10.6 (*)     Protein Total 7.9 (*)     Albumin 3.3 (*)     Globulin 4.6 (*)     Albumin/Globulin Ratio 0.7 (*)     Bilirubin Total 0.2      ALP 53      ALT 19      AST 29      eGFR 49      Anion Gap 10.0      BUN/Creatinine Ratio 27     CBC WITH DIFFERENTIAL - Abnormal    WBC 8.30      RBC 3.81 (*)     Hgb 9.9 (*)     Hct 31.9 (*)     MCV 83.7      MCH 26.0 (*)     MCHC 31.0 (*)     RDW 16.6      Platelet 267      MPV 9.1      Neut % 43.0      Lymph % 48.4      Mono % 6.4      Eos % 1.4      Basophil % 0.6      Lymph # 4.02      Neut # 3.56      Mono # 0.53      Eos # 0.12      Baso # 0.05      IG# 0.02      IG% 0.2     LACTIC ACID, PLASMA - Normal    Lactic Acid Level 0.8     BLOOD CULTURE OLG   BLOOD CULTURE OLG   CLOSTRIDIUM DIFFICILE TOXIN A AND B, EIA   CBC W/ AUTO DIFFERENTIAL    Narrative:     The following orders were created for panel order CBC auto differential.  Procedure                               Abnormality         Status                     ---------                               -----------         ------                     CBC with Differential[[id number]]       Abnormal            Final result                 Please view results for these tests on the individual orders.   HIGH SENSITIVITY CRP   GASTROINTESTINAL PATHOGENS PANEL, PCR   STOOL EXAM-OVA,CYSTS,PARASITES   FECAL LEUKOCYTES - LACTOFERRIN ON  STOOL   OCCULT BLOOD, STOOL 1ST SPECIMEN          Imaging Results    None          Medications - No data to display  Medical Decision Making  Pt is a 89 y/o female with  history of HTN, HLD, T2DM who presents for help with managing her PICC line at home. Pt was recently discharged from [facility] for treatment of osteomyelitis of the jaw for treatment with IV abx. Patient's daughter states that she was given the option to go home to manage her IV abx through a PICC or to be admitted to facility for 6 weeks for the course of antibiotics. Patient chose to go home, however she is only able to get home health every other day and is not able to manage her own antibiotics due to age, shoulder arthritis. Pt's daughter states that she is having diarrhea about 2-3 x/day. It is not foul smelling. Denies appetite changes, fever, chills, abdominal pain, urinary sx, weakness, falls. Patient is not on blood thinners.     Patient is not toxic appearing, in no acute distress. Afebrile. Vitals WNL, /70. No white count. Lactic 0.8. Patient on Rocephin IV 2g qd, vancomycin 350mg IV qod, metronidazole 500mg TID.     Amount and/or Complexity of Data Reviewed  External Data Reviewed: notes.     Details: Reviewed notes and workup from admission at [facility] including workup, imaging, and antibiotic treatment recommendations.   Labs: ordered. Decision-making details documented in ED Course.  Discussion of management or test interpretation with external provider(s): Discussed pt with Dr. Frank who had face-to-face with patient.   Discussed patient with Dr. Allen who is agreeable to admission and had face-to-face with patient.       Additional MDM:   Differential Diagnosis:   Other: The following diagnoses were also considered and will be evaluated: sepsis, anemia and electrolyte abnormality.                                   Clinical Impression:  Final diagnoses:  [M27.2] Osteomyelitis of jaw  [L89.159] Pressure injury of skin of sacral region, unspecified injury stage (Primary)  [R19.7] Diarrhea, unspecified type          ED Disposition Condition    Admit                  Trinity Kidd PA  07/23/24 1831

## 2024-07-24 LAB
ADV 40+41 DNA STL QL NAA+NON-PROBE: NOT DETECTED
ANION GAP SERPL CALC-SCNC: 8 MEQ/L
ASTRO TYP 1-8 RNA STL QL NAA+NON-PROBE: NOT DETECTED
BASOPHILS # BLD AUTO: 0.07 X10(3)/MCL
BASOPHILS NFR BLD AUTO: 1 %
BUN SERPL-MCNC: 23 MG/DL (ref 9.8–20.1)
C CAYETANENSIS DNA STL QL NAA+NON-PROBE: NOT DETECTED
C COLI+JEJ+UPSA DNA STL QL NAA+NON-PROBE: NOT DETECTED
CALCIUM SERPL-MCNC: 10.2 MG/DL (ref 8.4–10.2)
CHLORIDE SERPL-SCNC: 107 MMOL/L (ref 98–111)
CO2 SERPL-SCNC: 25 MMOL/L (ref 23–31)
COLOR STL: NORMAL
CONSISTENCY STL: NORMAL
CREAT SERPL-MCNC: 0.83 MG/DL (ref 0.55–1.02)
CREAT/UREA NIT SERPL: 28
CRP SERPL HS-MCNC: 1.25 MG/L
CRYPTOSP DNA STL QL NAA+NON-PROBE: NOT DETECTED
E HISTOLYT DNA STL QL NAA+NON-PROBE: NOT DETECTED
EAEC PAA PLAS AGGR+AATA ST NAA+NON-PRB: NOT DETECTED
EC STX1+STX2 GENES STL QL NAA+NON-PROBE: NOT DETECTED
EOSINOPHIL # BLD AUTO: 0.14 X10(3)/MCL (ref 0–0.9)
EOSINOPHIL NFR BLD AUTO: 2.1 %
EPEC EAE GENE STL QL NAA+NON-PROBE: NOT DETECTED
ERYTHROCYTE [DISTWIDTH] IN BLOOD BY AUTOMATED COUNT: 16.4 % (ref 11.5–17)
ETEC LTA+ST1A+ST1B TOX ST NAA+NON-PROBE: NOT DETECTED
FECAL LEUKOCYTE (OHS): NEGATIVE
G LAMBLIA DNA STL QL NAA+NON-PROBE: NOT DETECTED
GFR SERPLBLD CREATININE-BSD FMLA CKD-EPI: >60 ML/MIN/1.73/M2
GLUCOSE SERPL-MCNC: 99 MG/DL (ref 75–121)
HCT VFR BLD AUTO: 31.4 % (ref 37–47)
HEMOCCULT SP1 STL QL: NEGATIVE
HGB BLD-MCNC: 9.6 G/DL (ref 12–16)
IMM GRANULOCYTES # BLD AUTO: 0.01 X10(3)/MCL (ref 0–0.04)
IMM GRANULOCYTES NFR BLD AUTO: 0.1 %
LYMPHOCYTES # BLD AUTO: 4.13 X10(3)/MCL (ref 0.6–4.6)
LYMPHOCYTES NFR BLD AUTO: 61.4 %
MCH RBC QN AUTO: 25.7 PG (ref 27–31)
MCHC RBC AUTO-ENTMCNC: 30.6 G/DL (ref 33–36)
MCV RBC AUTO: 84 FL (ref 80–94)
MONOCYTES # BLD AUTO: 0.5 X10(3)/MCL (ref 0.1–1.3)
MONOCYTES NFR BLD AUTO: 7.4 %
NEUTROPHILS # BLD AUTO: 1.88 X10(3)/MCL (ref 2.1–9.2)
NEUTROPHILS NFR BLD AUTO: 28 %
NOROVIRUS GI+II RNA STL QL NAA+NON-PROBE: NOT DETECTED
P SHIGELLOIDES DNA STL QL NAA+NON-PROBE: NOT DETECTED
PLATELET # BLD AUTO: 270 X10(3)/MCL (ref 130–400)
PMV BLD AUTO: 9.3 FL (ref 7.4–10.4)
POCT GLUCOSE: 155 MG/DL (ref 70–110)
POCT GLUCOSE: 221 MG/DL (ref 70–110)
POCT GLUCOSE: 251 MG/DL (ref 70–110)
POCT GLUCOSE: 92 MG/DL (ref 70–110)
POTASSIUM SERPL-SCNC: 4.2 MMOL/L (ref 3.5–5.1)
RBC # BLD AUTO: 3.74 X10(6)/MCL (ref 4.2–5.4)
RVA RNA STL QL NAA+NON-PROBE: NOT DETECTED
S ENT+BONG DNA STL QL NAA+NON-PROBE: NOT DETECTED
SAPO I+II+IV+V RNA STL QL NAA+NON-PROBE: NOT DETECTED
SHIGELLA SP+EIEC IPAH ST NAA+NON-PROBE: NOT DETECTED
SODIUM SERPL-SCNC: 140 MMOL/L (ref 136–145)
V CHOL+PARA+VUL DNA STL QL NAA+NON-PROBE: NOT DETECTED
V CHOLERAE DNA STL QL NAA+NON-PROBE: NOT DETECTED
WBC # BLD AUTO: 6.73 X10(3)/MCL (ref 4.5–11.5)
Y ENTEROCOL DNA STL QL NAA+NON-PROBE: NOT DETECTED

## 2024-07-24 PROCEDURE — 11000001 HC ACUTE MED/SURG PRIVATE ROOM

## 2024-07-24 PROCEDURE — 36415 COLL VENOUS BLD VENIPUNCTURE: CPT | Performed by: INTERNAL MEDICINE

## 2024-07-24 PROCEDURE — 87177 OVA AND PARASITES SMEARS: CPT | Performed by: INTERNAL MEDICINE

## 2024-07-24 PROCEDURE — 25000003 PHARM REV CODE 250: Performed by: INTERNAL MEDICINE

## 2024-07-24 PROCEDURE — 85025 COMPLETE CBC W/AUTO DIFF WBC: CPT | Performed by: INTERNAL MEDICINE

## 2024-07-24 PROCEDURE — 89055 LEUKOCYTE ASSESSMENT FECAL: CPT | Performed by: INTERNAL MEDICINE

## 2024-07-24 PROCEDURE — 87209 SMEAR COMPLEX STAIN: CPT | Performed by: INTERNAL MEDICINE

## 2024-07-24 PROCEDURE — 94761 N-INVAS EAR/PLS OXIMETRY MLT: CPT

## 2024-07-24 PROCEDURE — 80048 BASIC METABOLIC PNL TOTAL CA: CPT | Performed by: INTERNAL MEDICINE

## 2024-07-24 PROCEDURE — 82272 OCCULT BLD FECES 1-3 TESTS: CPT | Performed by: INTERNAL MEDICINE

## 2024-07-24 PROCEDURE — 63600175 PHARM REV CODE 636 W HCPCS: Performed by: INTERNAL MEDICINE

## 2024-07-24 PROCEDURE — 87507 IADNA-DNA/RNA PROBE TQ 12-25: CPT | Performed by: INTERNAL MEDICINE

## 2024-07-24 RX ORDER — LIDOCAINE HYDROCHLORIDE 20 MG/ML
15 SOLUTION OROPHARYNGEAL ONCE
Status: COMPLETED | OUTPATIENT
Start: 2024-07-24 | End: 2024-07-24

## 2024-07-24 RX ORDER — ALUMINUM HYDROXIDE, MAGNESIUM HYDROXIDE, AND SIMETHICONE 1200; 120; 1200 MG/30ML; MG/30ML; MG/30ML
30 SUSPENSION ORAL ONCE
Status: COMPLETED | OUTPATIENT
Start: 2024-07-24 | End: 2024-07-24

## 2024-07-24 RX ADMIN — DAPTOMYCIN 350 MG: 500 INJECTION, POWDER, LYOPHILIZED, FOR SOLUTION INTRAVENOUS at 12:07

## 2024-07-24 RX ADMIN — METRONIDAZOLE 500 MG: 250 TABLET ORAL at 09:07

## 2024-07-24 RX ADMIN — METRONIDAZOLE 500 MG: 250 TABLET ORAL at 02:07

## 2024-07-24 RX ADMIN — INSULIN ASPART 3 UNITS: 100 INJECTION, SOLUTION INTRAVENOUS; SUBCUTANEOUS at 04:07

## 2024-07-24 RX ADMIN — ASPIRIN 81 MG CHEWABLE TABLET 81 MG: 81 TABLET CHEWABLE at 09:07

## 2024-07-24 RX ADMIN — CARVEDILOL 6.25 MG: 6.25 TABLET, FILM COATED ORAL at 09:07

## 2024-07-24 RX ADMIN — CEFTRIAXONE SODIUM 2 G: 2 INJECTION, POWDER, FOR SOLUTION INTRAMUSCULAR; INTRAVENOUS at 09:07

## 2024-07-24 RX ADMIN — MUPIROCIN 1 G: 20 OINTMENT TOPICAL at 09:07

## 2024-07-24 RX ADMIN — ALUMINUM HYDROXIDE, MAGNESIUM HYDROXIDE, AND SIMETHICONE 30 ML: 200; 200; 20 SUSPENSION ORAL at 11:07

## 2024-07-24 RX ADMIN — Medication 1 EACH: at 09:07

## 2024-07-24 RX ADMIN — METRONIDAZOLE 500 MG: 250 TABLET ORAL at 06:07

## 2024-07-24 RX ADMIN — INSULIN ASPART 2 UNITS: 100 INJECTION, SOLUTION INTRAVENOUS; SUBCUTANEOUS at 12:07

## 2024-07-24 RX ADMIN — LIDOCAINE HYDROCHLORIDE 15 ML: 20 SOLUTION ORAL at 11:07

## 2024-07-24 NOTE — PROGRESS NOTES
[facility] General - ICU  Wound Care    Patient Name: Augusta James  MRN: [id number]  Date: 7/24/2024  Diagnosis: Osteomyelitis of jaw      Subjective:           Patient ID: Augusta James is a 89 y.o. female.    Chief Complaint: cannot manage her abx (Unable to manage her IV abx   Home health wants pt admitted so she can go to LTAC)      HPI      Past Medical History:     1. Pressure injury of skin of sacral region, unspecified injury stage    2. Osteomyelitis of jaw    3. Diarrhea, unspecified type      Wound Assessment:           Wound 07/17/24 1900 Other (comment) Chin #1 (Active)   07/17/24 1900   Present on Original Admission: Y   Primary Wound Type: Other   Side:    Orientation:    Location: Chin   Wound Approximate Age at First Assessment (Weeks):    Wound Number: #1   Is this injury device related?:    Incision Type:    Closure Method:    Wound Description (Comments):    Type:    Additional Comments:    Ankle-Brachial Index:    Pulses:    Removal Indication and Assessment:    Wound Outcome:    Dressing Appearance Dry;Intact;Clean 07/24/24 0711   Appearance Dressing in place, unable to visualize 07/24/24 0711   Dressing Bandaid 07/23/24 2015            Wound 07/24/24 1417 Pressure Injury Right Buttocks (Active)   07/24/24 1417   Present on Original Admission: Y   Primary Wound Type: Pressure inj   Side: Right   Orientation:    Location: Buttocks   Wound Approximate Age at First Assessment (Weeks):    Wound Number:    Is this injury device related?:    Incision Type:    Closure Method:    Wound Description (Comments):    Type:    Additional Comments:    Ankle-Brachial Index:    Pulses:    Removal Indication and Assessment:    Wound Outcome:    Wound Image   07/24/24 1417   Pressure Injury Stage 1 07/24/24 1417   Dressing Appearance Dry 07/24/24 1417   Drainage Amount None 07/24/24 1417   Appearance Pink;Dry 07/24/24 1417   Tissue loss description Not applicable 07/24/24 1417   Periwound Area Redness;Ecchymotic  07/24/24 1417   Wound Edges Defined 07/24/24 1417   Wound Length (cm) 2.5 cm 07/24/24 1417   Wound Width (cm) 2.5 cm 07/24/24 1417   Wound Depth (cm) 0.1 cm 07/24/24 1417   Wound Volume (cm^3) 0.625 cm^3 07/24/24 1417   Wound Surface Area (cm^2) 6.25 cm^2 07/24/24 1417   Care Cleansed with:;Other (see comments) 07/24/24 1417           Plan:     Daily assessments and dressing changes per nursing staff, re-evaluation per wound care in 5-7 days        Recommendations:    Buttocks: keep clean and dry, cover with foam, change every other day or prn soilage.         Time spent in room:     Micaela Fleming RN

## 2024-07-24 NOTE — PROGRESS NOTES
"Inpatient Nutrition Evaluation    Admit Date: 7/23/2024   Total duration of encounter: 1 day    Nutrition Recommendation/Prescription     Continue 1800 Calorie Diabetic Diet as tolerated.   Monitor intake, tolerance, weight, and labs.       RD following and available as needed. Thank you.     Nutrition Assessment     Chart Review    Reason Seen: continuous nutrition monitoring    Malnutrition Screening Tool Results   Have you recently lost weight without trying?: No  Have you been eating poorly because of a decreased appetite?: No   MST Score: 0     Diagnosis:  Osteomyelitis of jaw.     Relevant Medical History:   Arthritis      Diabetes mellitus      Hypertension          Nutrition-Related Medications:   Insulin; Lactobacillus acidophilus & bulgar.     Nutrition-Related Labs:  7/24: H/H 9.6/31.4(L); BUN 23(H).    Diet Order: Diet diabetic 1800 Calorie  Oral Supplement Order: none  Appetite/Oral Intake: good/% of meals  Factors Affecting Nutritional Intake: none identified  Food/Latter-day/Cultural Preferences: none reported  Food Allergies: none reported    Skin Integrity: intact  Wound(s):      Wound 07/24/24 1417 Pressure Injury Right Buttocks-Tissue loss description: Not applicable     Comments    7/24: Pt asleep during rounds. Spoke with nursing who reports pt with good appetite and intake, tolerating meals, with no chewing or swallowing issues. No recent weight loss noted/reported. Labs and meds reviewed. Case Management working on placement. Will continue to monitor during stay.     Anthropometrics    Height: 4' 6" (137.2 cm)    Last Weight: 46.1 kg (101 lb 10.1 oz) (07/23/24 2101) Weight Method: Bed Scale  BMI (Calculated): 24.5  BMI Classification: normal (BMI 18.5-24.9)     Ideal Body Weight (IBW), Female: 70 lb     % Ideal Body Weight, Female (lb): 145.19 %                             Usual Weight Provided By: unable to obtain usual weight    Wt Readings from Last 5 Encounters:   07/23/24 46.1 kg " (101 lb 10.1 oz)   07/17/24 44.2 kg (97 lb 6.4 oz)   03/24/24 51.7 kg (113 lb 15.7 oz)   03/04/24 59 kg (130 lb)   10/06/23 55.2 kg (121 lb 12.8 oz)     Weight Change(s) Since Admission:  Admit Weight: 45.4 kg (100 lb) (07/23/24 1625)      Patient Education    Not applicable.    Monitoring & Evaluation     Dietitian will monitor food and beverage intake, weight, weight change, electrolyte/renal panel, glucose/endocrine profile, and gastrointestinal profile.  Nutrition Risk/Follow-Up: low (follow-up in 5-7 days)  Patients assigned 'low nutrition risk' status do not qualify for a full nutritional assessment but will be monitored and re-evaluated in a 5-7 day time period. Please consult if re-evaluation needed sooner.

## 2024-07-24 NOTE — PROGRESS NOTES
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 Patient is unable to give herself the injections as home health is only available every other day. So she comes back to hospital to see if she can be discharged to long term acute care during the duration of antibiotic use (a total of 6 weeks). Patient notes complaints of diarrhea that is likely an adverse effect of triple antibiotic regimen.     July 24, 2024-osteomyelitis was treated with IV daptomycin, IV Rocephin, no fever, no shortness for breath      Case was discussed with patient's nurse and  on the floor.    Objective/physical exam:  General: In no acute distress, afebrile  Chest: Clear to auscultation bilaterally  Heart: RRR, +S1, S2, no appreciable murmur  Abdomen: Soft, nontender, BS +  MSK: Warm, no lower extremity edema, no clubbing or cyanosis  Neurologic: Alert and oriented x4, Cranial nerve II-XII intact, Strength 5/5 in all 4 extremities    VITAL SIGNS: 24 HRS MIN & MAX LAST   Temp  Min: 97.1 °F (36.2 °C)  Max: 98.8 °F (37.1 °C) 97.1 °F (36.2 °C)   BP  Min: 111/43  Max: 169/67 (!) 129/51   Pulse  Min: 48  Max: 79  (!) 51   Resp  Min: 12  Max: 30 18   SpO2  Min: 97 %  Max: 100 % 99 %     I have reviewed the following labs:  Recent Labs   Lab 07/22/24  0420 07/23/24  1705 07/24/24  0340   WBC 7.47 8.30 6.73   RBC 4.00 3.81* 3.74*   HGB 10.3* 9.9* 9.6*   HCT 32.4* 31.9* 31.4*   MCV 81.0 83.7 84.0   MCH 25.8* 26.0* 25.7*   MCHC 31.8 31.0* 30.6*   RDW 15.8* 16.6 16.4    267 270   MPV 8.6* 9.1 9.3     Recent Labs   Lab 07/17/24  1525 07/18/24  0505 07/19/24  0408 07/22/24  0420 07/23/24  1705 07/24/24  0340    137   < > 137 139 140   K 4.3 4.0   < > 4.3 4.3 4.2     103   < > 107 106 107   CO2 26 26   < > 25 23 25   BUN 27* 23*   < > 29* 29.0* 23.0*   CREATININE 0.84 0.84   < > 0.85 1.07* 0.83   CALCIUM 10.3* 10.0   < > 10.0 10.6* 10.2   ALBUMIN 4.1 3.6  --   --  3.3*  --    ALKPHOS 81 76  --   --  53  --    ALT 13 10  --   --  19  --    AST 24 19  --   --  29  --    BILITOT 0.5 0.2  --   --  0.2  --     < > = values in this interval not displayed.     Microbiology Results (last 7 days)       Procedure Component Value Units Date/Time    Clostridium Diff Toxin, A & B, EIA [[id number]] Collected: 07/24/24 0144    Order Status: Canceled Specimen: Stool Updated: 07/24/24 0157    Blood Culture #2 **CANNOT BE ORDERED STAT** [[id number]] Collected: 07/23/24 1705    Order Status: Resulted Specimen: Blood from Arm, Right Updated: 07/23/24 1710    Blood Culture #1 **CANNOT BE ORDERED STAT** [[id number]] Collected: 07/23/24 1705    Order Status: Resulted Specimen: Blood from Arm, Right Updated: 07/23/24 1710             See below for Radiology    Assessment/Plan:  Jaw osteomyelitis   Diabetes  Hypertension     Continue IV daptomycin, IV Rocephin, p.o. Flagyl   Consult  for swing bed for 6 weeks of IV antibiotic    VTE prophylaxis:     Patient condition:  Stable/Fair/Guarded/ Serious/ Critical    Anticipated discharge and Disposition:         All diagnosis and differential diagnosis have been reviewed; assessment and plan has been documented; I have personally reviewed the labs and test results that are presently available; I have reviewed the patients medication list; I have reviewed the consulting providers response and recommendations. I have reviewed or attempted to review medical records based upon their availability    All of the patient's questions have been  addressed and answered. Patient's is agreeable to the above stated plan. I will continue to monitor closely and make adjustments to medical management as needed.    Portions of this note dictated using EMR  integrated voice recognition software, and may be subject to voice recognition errors not corrected at proofreading. Please contact writer for clarification if needed.   _____________________________________________________________________    Malnutrition Status:    Scheduled Med:   aspirin  81 mg Oral Daily    carvediloL  6.25 mg Oral BID    cefTRIAXone (Rocephin) IV (PEDS and ADULTS)  2 g Intravenous Q24H    DAPTOmycin (CUBICIN) IV (PEDS and ADULTS)  350 mg Intravenous Q48H    lactobacillus acidophilus & bulgar  1 packet Oral BID    metroNIDAZOLE  500 mg Oral Q8H    mupirocin   Nasal BID      Continuous Infusions:     PRN Meds:    Current Facility-Administered Medications:     dextrose 10%, 12.5 g, Intravenous, PRN    dextrose 10%, 25 g, Intravenous, PRN    glucagon (human recombinant), 1 mg, Intramuscular, PRN    glucose, 16 g, Oral, PRN    glucose, 24 g, Oral, PRN    hydrALAZINE, 10 mg, Intravenous, Q6H PRN    insulin aspart U-100, 0-5 Units, Subcutaneous, QID (AC + HS) PRN    melatonin, 6 mg, Oral, Nightly PRN    sodium chloride 0.9%, 10 mL, Intravenous, PRN     Radiology:  I have personally reviewed the following imaging and agree with the radiologist.     X-Ray Chest AP Portable  Narrative: EXAMINATION:  Chest one view    CLINICAL HISTORY:  PICC line placement    COMPARISON:  None    FINDINGS:  Left-sided intra PICC line tip projects over the right atrium.  There is no visible pneumothorax.  Lungs are clear.  Cardiac silhouette is normal in size.  There is no pleural effusion.  Impression: Left-sided intra PICC line tip projects over the right atrium.    No acute cardiopulmonary process.    Electronically signed by: Mukesh Rick MD  Date:    07/18/2024  Time:    17:40      Corey Allen MD  Department of Hospital Medicine   [facility]   07/24/2024

## 2024-07-24 NOTE — H&P
[facility] - Emergency Dept    History & Physical      Patient Name: Augusta James  MRN: [id number]  Admission Date: 7/23/2024  Attending Physician: Corey Allen MD   Primary Care Provider: Capo Kelly MD         Patient information was obtained from patient and ER records.     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 Patient is unable to give herself the injections as home health is only available every other day. So she comes back to hospital to see if she can be discharged to long term acute care during the duration of antibiotic use (a total of 6 weeks). Patient notes complaints of diarrhea that is likely an adverse effect of triple antibiotic regimen.     Past Medical History:   Diagnosis Date    Arthritis     Diabetes mellitus     Hypertension        Past Surgical History:   Procedure Laterality Date    EYE SURGERY      HYSTERECTOMY      TONSILLECTOMY         Review of patient's allergies indicates:  No Known Allergies    No current facility-administered medications on file prior to encounter.     Current Outpatient Medications on File Prior to Encounter   Medication Sig    alendronate (FOSAMAX) 70 MG tablet Take 70 mg by mouth every 7 days.    aspirin 81 mg Cap Take 81 mg by mouth once daily.    carvediloL (COREG) 6.25 MG tablet Take 6.25 mg by mouth 2 (two) times daily.    dapagliflozin propanediol (FARXIGA) 5 mg Tab tablet Take 10 mg by mouth once daily.    EScitalopram oxalate (LEXAPRO) 10 MG tablet Take 10 mg by mouth once daily.    hydroCHLOROthiazide (HYDRODIURIL)  25 MG tablet Take 25 mg by mouth once daily.    lovastatin (MEVACOR) 20 MG tablet Take 20 mg by mouth every evening.    metFORMIN (GLUCOPHAGE-XR) 500 MG ER 24hr tablet Take 500 mg by mouth 2 (two) times daily.    metroNIDAZOLE (FLAGYL) 500 MG tablet Take 1 tablet (500 mg total) by mouth every 8 (eight) hours.    0.9% NaCl SolP 50 mL with DAPTOmycin 500 mg SolR 350 mg Inject 350 mg into the vein every 48 hours. X 6 weeks (Patient not taking: Reported on 7/23/2024)    cefTRIAXone (ROCEPHIN) 1 gram injection Inject 2 g into the vein once daily. X 6 week s (Patient not taking: Reported on 7/23/2024)    lactobacillus acidophilus & bulgar (LACTINEX) 100 million cell packet Take 1 packet (1 each total) by mouth 2 (two) times daily.     Family History    None       Tobacco Use    Smoking status: Never    Smokeless tobacco: Never   Substance and Sexual Activity    Alcohol use: Never    Drug use: Never    Sexual activity: Not Currently     Review of Systems   HENT:  Positive for facial swelling.    Gastrointestinal:  Positive for diarrhea.   Neurological:  Positive for weakness.     Objective:     Vital Signs (Most Recent):  Temp: 98.8 °F (37.1 °C) (07/23/24 2015)  Pulse: 71 (07/23/24 2100)  Resp: 14 (07/23/24 2100)  BP: (!) 160/67 (07/23/24 2130)  SpO2: 99 % (07/23/24 2100) Vital Signs (24h Range):  Temp:  [97.9 °F (36.6 °C)-98.8 °F (37.1 °C)] 98.8 °F (37.1 °C)  Pulse:  [71-79] 71  Resp:  [14-16] 14  SpO2:  [97 %-99 %] 99 %  BP: (160-169)/(64-77) 160/67     Weight: 46.1 kg (101 lb 10.1 oz)  Body mass index is 24.5 kg/m².    Physical Exam  Constitutional:       Appearance: Normal appearance.   HENT:      Head: Normocephalic and atraumatic.      Nose: Nose normal.      Mouth/Throat:      Mouth: Mucous membranes are dry.   Cardiovascular:      Rate and Rhythm: Normal rate and regular rhythm.      Pulses: Normal pulses.      Heart sounds: Normal heart sounds.   Pulmonary:      Breath sounds: Normal breath sounds.   Abdominal:       Palpations: Abdomen is soft.   Musculoskeletal:         General: Normal range of motion.      Cervical back: Neck supple.   Skin:     General: Skin is warm and dry.      Capillary Refill: Capillary refill takes less than 2 seconds.   Neurological:      General: No focal deficit present.      Mental Status: She is alert. Mental status is at baseline.      Motor: Weakness present.            Significant Labs: All pertinent labs within the past 24 hours have been reviewed.  Recent Lab Results         07/23/24  2107   07/23/24  1705        Albumin/Globulin Ratio   0.7       Albumin   3.3       ALP   53       ALT   19       Anion Gap   10.0       AST   29       Baso #   0.05       Basophil %   0.6       BILIRUBIN TOTAL   0.2       BUN   29.0       BUN/CREAT RATIO   27       Calcium   10.6       Chloride   106       CO2   23       Creatinine   1.07       eGFR   49       Eos #   0.12       Eos %   1.4       Globulin, Total   4.6       Glucose   168       Hematocrit   31.9       Hemoglobin   9.9       Immature Grans (Abs)   0.02       Immature Granulocytes   0.2       Lactic Acid Level   0.8       Lymph #   4.02       LYMPH %   48.4       MCH   26.0       MCHC   31.0       MCV   83.7       Mono #   0.53       Mono %   6.4       MPV   9.1       Neut #   3.56       Neut %   43.0       Platelet Count   267       POCT Glucose 145         Potassium   4.3       PROTEIN TOTAL   7.9       RBC   3.81       RDW   16.6       Sodium   139       WBC   8.30               Significant Imaging: I have reviewed all pertinent imaging results/findings within the past 24 hours.    Assessment/Plan:     Active Diagnoses:    Diagnosis Date Noted POA    PRINCIPAL PROBLEM:  Osteomyelitis of jaw [M27.2] 07/23/2024 Yes    Diarrhea [R19.7] 07/23/2024 Yes    Pressure injury of skin of sacral region [L89.159] 07/23/2024 Yes      Problems Resolved During this Admission:     VTE Risk Mitigation (From admission, onward)           Ordered     IP VTE HIGH  RISK PATIENT  Once         07/23/24 1953     Place sequential compression device  Until discontinued         07/23/24 1953                  Osteomyelitis of Jaw:  Still requiring completion of long term antibiotics for a total of 6 weeks.  She is unable to give herself the treatment on days when home health is not available  We will resume Daptomycin 350 mg IV QOD, Flagyl 500 mg po TID, and Rocephin 2 gm IV daily    DM II:  Sliding scale Insulin  Accu cheks  ACHS  Hypoglycemic protocol    HTN:  Resume home meds once reconciled  Give IV antihypertensive for prn use with parameters        Code: FULL   PPX: BSCDs        The patient is expected to have a LOS more than 2 midnights and will be admitted to inpatient status.      Service was provided using HIPAA compliant web platform using SOC for audio/visual equipment.  Patient location: Hospital  Provider Location: [place], [place]  Participants on call: Bedside RN, Patient  Consent was obtained and the patient was seen with nurse assiting from the bedside.         Lulu Galeano MD  Department of Hospital Medicine   [facility] - Emergency Dept

## 2024-07-24 NOTE — PLAN OF CARE
07/24/24 1656   Discharge Assessment   Assessment Type Discharge Planning Assessment   Confirmed Demographics Correct on Facesheet   Source of Information family   Reason For Admission osteomylitis   People in Home spouse   Do you expect to return to your current living situation? No   Do you have help at home or someone to help you manage your care at home? No   Who are your caregiver(s) and their phone number(s)? [place]a daughter in [place] [phone]   Prior to hospitilization cognitive status: Alert/Oriented   Current cognitive status: Alert/Oriented   Walking or Climbing Stairs Difficulty yes   Walking or Climbing Stairs ambulation difficulty, requires equipment   Mobility Management walker   Dressing/Bathing Difficulty no   Home Layout Able to live on 1st floor   Equipment Currently Used at Home rollator   Readmission within 30 days? No   Patient currently being followed by outpatient case management? No   Do you currently have service(s) that help you manage your care at home? No   Do you take prescription medications? Yes   Do you have prescription coverage? Yes   Do you have any problems affording any of your prescribed medications? No   Is the patient taking medications as prescribed? yes   How do you get to doctors appointments? family or friend will provide   Are you on dialysis? No   Do you take coumadin? No   Discharge Plan A Skilled Nursing Facility   Discharge Plan B Skilled Nursing Facility   DME Needed Upon Discharge  medication pump   Discharge Plan discussed with: Adult children;Patient   Transition of Care Barriers None   Physical Activity   On average, how many days per week do you engage in moderate to strenuous exercise (like a brisk walk)? Pt Declined   On average, how many minutes do you engage in exercise at this level? Pt Declined   Financial Resource Strain   How hard is it for you to pay for the very basics like food, housing, medical care, and heating? Pt Declined   Housing Stability    In the last 12 months, was there a time when you were not able to pay the mortgage or rent on time? Pt Declined   At any time in the past 12 months, were you homeless or living in a shelter (including now)? Pt Declined   Transportation Needs   Has the lack of transportation kept you from medical appointments, meetings, work or from getting things needed for daily living? Patient declined   Food Insecurity   Within the past 12 months, you worried that your food would run out before you got the money to buy more. Pt Declined   Within the past 12 months, the food you bought just didn't last and you didn't have money to get more. Pt Declined   Stress   Do you feel stress - tense, restless, nervous, or anxious, or unable to sleep at night because your mind is troubled all the time - these days? Pt Declined   Social Isolation   How often do you feel lonely or isolated from those around you?  Patient declined   Alcohol Use   Q1: How often do you have a drink containing alcohol? Pt Declined   Q2: How many drinks containing alcohol do you have on a typical day when you are drinking? Pt Declined   Q3: How often do you have six or more drinks on one occasion? Pt Declined   Utilities   In the past 12 months has the electric, gas, oil, or water company threatened to shut off services in your home? Pt Declined   Health Literacy   How often do you need to have someone help you when you read instructions, pamphlets, or other written material from your doctor or pharmacy? Patient declines to respond     Spoke to daughter and she has asked that I send referral to [facility] for placement.  Referral sent to [facility] with [facility]. Radhad Nafisa for [place]/

## 2024-07-24 NOTE — PLAN OF CARE
Problem: Adult Inpatient Plan of Care  Goal: Plan of Care Review  Outcome: Progressing  Goal: Patient-Specific Goal (Individualized)  Outcome: Progressing  Goal: Absence of Hospital-Acquired Illness or Injury  Outcome: Progressing  Goal: Optimal Comfort and Wellbeing  Outcome: Progressing  Goal: Readiness for Transition of Care  Outcome: Progressing     Problem: Infection  Goal: Absence of Infection Signs and Symptoms  Outcome: Progressing     Problem: Wound  Goal: Optimal Coping  Outcome: Progressing  Goal: Optimal Functional Ability  Outcome: Progressing  Goal: Absence of Infection Signs and Symptoms  Outcome: Progressing  Goal: Improved Oral Intake  Outcome: Progressing  Goal: Optimal Pain Control and Function  Outcome: Progressing  Goal: Skin Health and Integrity  Outcome: Progressing  Goal: Optimal Wound Healing  Outcome: Progressing     Problem: Fall Injury Risk  Goal: Absence of Fall and Fall-Related Injury  Outcome: Progressing     Problem: Diabetes Comorbidity  Goal: Blood Glucose Level Within Targeted Range  Outcome: Progressing     Problem: Comorbidity Management  Goal: Blood Pressure in Desired Range  Outcome: Progressing

## 2024-07-25 LAB
POCT GLUCOSE: 117 MG/DL (ref 70–110)
POCT GLUCOSE: 168 MG/DL (ref 70–110)
POCT GLUCOSE: 253 MG/DL (ref 70–110)
POCT GLUCOSE: 257 MG/DL (ref 70–110)

## 2024-07-25 PROCEDURE — 25000003 PHARM REV CODE 250: Performed by: INTERNAL MEDICINE

## 2024-07-25 PROCEDURE — 97162 PT EVAL MOD COMPLEX 30 MIN: CPT

## 2024-07-25 PROCEDURE — 63600175 PHARM REV CODE 636 W HCPCS: Performed by: INTERNAL MEDICINE

## 2024-07-25 PROCEDURE — 94761 N-INVAS EAR/PLS OXIMETRY MLT: CPT

## 2024-07-25 PROCEDURE — 97116 GAIT TRAINING THERAPY: CPT

## 2024-07-25 PROCEDURE — 11000001 HC ACUTE MED/SURG PRIVATE ROOM

## 2024-07-25 RX ADMIN — ASPIRIN 81 MG CHEWABLE TABLET 81 MG: 81 TABLET CHEWABLE at 10:07

## 2024-07-25 RX ADMIN — CEFTRIAXONE SODIUM 2 G: 2 INJECTION, POWDER, FOR SOLUTION INTRAMUSCULAR; INTRAVENOUS at 08:07

## 2024-07-25 RX ADMIN — Medication 1 EACH: at 08:07

## 2024-07-25 RX ADMIN — METRONIDAZOLE 500 MG: 250 TABLET ORAL at 09:07

## 2024-07-25 RX ADMIN — CARVEDILOL 6.25 MG: 6.25 TABLET, FILM COATED ORAL at 10:07

## 2024-07-25 RX ADMIN — CARVEDILOL 6.25 MG: 6.25 TABLET, FILM COATED ORAL at 08:07

## 2024-07-25 RX ADMIN — INSULIN ASPART 1 UNITS: 100 INJECTION, SOLUTION INTRAVENOUS; SUBCUTANEOUS at 09:07

## 2024-07-25 RX ADMIN — METRONIDAZOLE 500 MG: 250 TABLET ORAL at 03:07

## 2024-07-25 RX ADMIN — INSULIN ASPART 3 UNITS: 100 INJECTION, SOLUTION INTRAVENOUS; SUBCUTANEOUS at 05:07

## 2024-07-25 RX ADMIN — MUPIROCIN 1 G: 20 OINTMENT TOPICAL at 10:07

## 2024-07-25 RX ADMIN — METRONIDAZOLE 500 MG: 250 TABLET ORAL at 06:07

## 2024-07-25 RX ADMIN — MUPIROCIN 1 G: 20 OINTMENT TOPICAL at 08:07

## 2024-07-25 NOTE — PT/OT/SLP PROGRESS
Physical Therapy Treatment    Patient Name:  Augusta James   MRN:  [id number]    Recommendations:     Discharge Recommendations: Moderate Intensity Therapy  Discharge Equipment Recommendations: none  Barriers to discharge:  no caregiver support    Assessment:     Augusta James is a 89 y.o. female admitted with a medical diagnosis of Osteomyelitis of jaw.  She presents with the following impairments/functional limitations: weakness, impaired endurance, impaired functional mobility, gait instability, impaired balance, impaired self care skills .    Rehab Prognosis: Good; patient would benefit from acute skilled PT services to address these deficits and reach maximum level of function.    Recent Surgery: * No surgery found *      Plan:     During this hospitalization, patient to be seen 6 x/week (1-2x daily as needed) to address the identified rehab impairments via gait training, therapeutic activities, therapeutic exercises and progress toward the following goals:    Plan of Care Expires:       Subjective     Chief Complaint: weakness  Patient/Family Comments/goals: to go to rehab upon discharge from the hospital.  Pain/Comfort:  Pain Rating 1: 0/10      Objective:     Communicated with nurse prior to session.  Patient found up in chair with pulse ox (continuous), telemetry, blood pressure cuff, PureWick upon PT entry to room.     General Precautions: Standard, fall  Orthopedic Precautions: N/A  Braces: N/A  Respiratory Status: Room air     Functional Mobility:  Bed Mobility:     Sit to Supine: moderate assistance  Transfers:     Sit to Stand:  minimum assistance with rolling walker  Bed to Chair: minimum assistance with  rolling walker  using  Step Transfer  Toilet Transfer: moderate assistance with  rolling walker  using  Step Transfer  Gait: Patient ambulated ~120' with RW and min A, no SOB, good awareness of surroundings - just mild weakness      AM-PAC 6 CLICK MOBILITY          Treatment & Education:  Please see  above. In addition, patient participated in the following ADLs: required set-up for brushing her teeth but completed the act independently, required total assist for LB hygiene mgmt post bowel movement.    Patient is a great candidate for continued therapy via SNF or LTAC to improve her independence and safety with mobility and ADLs prior to returning home.    Patient left HOB elevated with all lines intact, call button in reach, and nurse notified..    GOALS:   Multidisciplinary Problems       Physical Therapy Goals          Problem: Physical Therapy    Goal Priority Disciplines Outcome Goal Variances Interventions   Physical Therapy Goal     PT, PT/OT Progressing     Description: Goals to be met by: discharge     Patient will increase functional independence with mobility by performin. Bed to chair transfer with Modified [place] using Rolling Walker  2. Gait  x 150 feet with Modified [place] using Rolling Walker.                          Time Tracking:     PT Received On: 24  PT Start Time: 1327     PT Stop Time: 1346  PT Total Time (min): 19 min     Billable Minutes: Gait Training 19    Treatment Type: Treatment  PT/PTA: PT           2024

## 2024-07-25 NOTE — PLAN OF CARE
Per Amie with [facility], Dr. Leon has denied pt without a biopsy done to lesion to rule out Ca vs. Osteo. Notified Dr. Aleln.

## 2024-07-25 NOTE — PT/OT/SLP EVAL
Physical Therapy Evaluation    Patient Name:  Augusta James   MRN:  [id number]    Recommendations:     Discharge Recommendations: Moderate Intensity Therapy Would benefit from LTAC vs SNF  Discharge Equipment Recommendations: none   Barriers to discharge:  inability to care for self regarding her medication administration, spouse has dementia and is unable to assist    Assessment:     Augusta James is a 89 y.o. female admitted with a medical diagnosis of Osteomyelitis of jaw.  She presents with the following impairments/functional limitations: weakness, impaired endurance, impaired functional mobility, gait instability, impaired balance, impaired self care skills .    Assessment and Treatment: Patient did well overall. Did require min A for bed mobility (supine>sit), transfers, and gait with RW - ambulated ~60ft with RW and min A before fatiguing, HR (55-60) with /56. Due to weakness, patient is at risk for falls when ambulating in her environment. Patient states she was unable to administer her antibiotics for her osteomyelitis at home. Patient is a good candidate for LTAC/SNF to improve her independence and mobility prior to returning home. Patient does live with her spouse but he has dementia and is unable to assist with mobility/ADLs.    Rehab Prognosis: Good; patient would benefit from acute skilled PT services to address these deficits and reach maximum level of function.    Recent Surgery: * No surgery found *      Plan:     During this hospitalization, patient to be seen 6 x/week (1-2x daily as needed) to address the identified rehab impairments via gait training, therapeutic activities, therapeutic exercises and progress toward the following goals:    Plan of Care Expires:       Subjective     Chief Complaint: Weakness  Patient/Family Comments/goals: to go to a rehab facility before going home.  Pain/Comfort:  Pain Rating 1: 0/10    Patients cultural, spiritual, Anglican conflicts given the current  situation:      Living Environment:  Patient lives in a home with no steps to enter, used rollator/walker for mobility, was independent with her ADLs. Her  lives with her but he has a caregiver due to his dementia.   Equipment used at home: rollator.  DME owned (not currently used): none.  Upon discharge, patient will have assistance from no one.    Objective:     Communicated with nurse prior to session.  Patient found up in chair with pulse ox (continuous), telemetry, blood pressure cuff, PureWick  upon PT entry to room.    General Precautions: Standard, fall  Orthopedic Precautions:N/A   Braces: N/A  Respiratory Status: Room air    Exams:  Cognitive Exam:  Patient is oriented to Person, Place, Time, and Situation  LUE Strength: WFL  RLE ROM: WFL  RLE Strength: WFL  LLE ROM: WFL    Functional Mobility:  Bed Mobility:     Supine to Sit: minimum assistance  Transfers:     Sit to Stand:  minimum assistance with rolling walker  Bed to Chair: minimum assistance with  rolling walker  using  Step Transfer  Gait:  ambulated ~60ft before fatiguing, HR (55-60) with /56. (Min A with RW)      AM-PAC 6 CLICK MOBILITY  Total Score:        Treatment & Education:  Please see assessment for full details    Patient left up in chair with all lines intact, call button in reach, and nurse notified.    GOALS:   Multidisciplinary Problems       Physical Therapy Goals          Problem: Physical Therapy    Goal Priority Disciplines Outcome Goal Variances Interventions   Physical Therapy Goal     PT, PT/OT Progressing     Description: Goals to be met by: discharge     Patient will increase functional independence with mobility by performin. Bed to chair transfer with Modified [place] using Rolling Walker  2. Gait  x 150 feet with Modified [place] using Rolling Walker.                          History:     Past Medical History:   Diagnosis Date    Arthritis     Diabetes mellitus     Hypertension        Past  Surgical History:   Procedure Laterality Date    EYE SURGERY      HYSTERECTOMY      TONSILLECTOMY         Time Tracking:     PT Received On: 07/25/24  PT Start Time: 0938     PT Stop Time: 0959  PT Total Time (min): 21 min     Billable Minutes: Evaluation 13 and Gait Training 8      07/25/2024

## 2024-07-25 NOTE — PLAN OF CARE
Geovanna will submit to ins for SNF auth, pt will d/c on IV Vanc 1gm BID X 6 weeks instead of IV Cubicin.

## 2024-07-25 NOTE — PROGRESS NOTES
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 Patient is unable to give herself the injections as home health is only available every other day. So she comes back to hospital to see if she can be discharged to long term acute care during the duration of antibiotic use (a total of 6 weeks). Patient notes complaints of diarrhea that is likely an adverse effect of triple antibiotic regimen.     July 24, 2024-osteomyelitis was treated with IV daptomycin, IV Rocephin, no fever, no shortness for breath    July 25, 2024-complain of diarrhea 3 times last 24 hours, no nausea, no vomiting, no abdominal pain, no fever  Case was discussed with patient's nurse and  on the floor.    Objective/physical exam:  General: In no acute distress, afebrile  Chest: Clear to auscultation bilaterally  Heart: RRR, +S1, S2, no appreciable murmur  Abdomen: Soft, nontender, BS +  MSK: Warm, no lower extremity edema, no clubbing or cyanosis  Neurologic: Alert and oriented x4, Cranial nerve II-XII intact, Strength 5/5 in all 4 extremities    VITAL SIGNS: 24 HRS MIN & MAX LAST   Temp  Min: 97.3 °F (36.3 °C)  Max: 98.6 °F (37 °C) 98.1 °F (36.7 °C)   BP  Min: 119/50  Max: 159/56 (!) 159/56   Pulse  Min: 47  Max: 84  (!) 49   Resp  Min: 3  Max: 31 19   SpO2  Min: 88 %  Max: 100 % 99 %     I have reviewed the following labs:  Recent Labs   Lab 07/22/24  0420 07/23/24  1705 07/24/24  0340   WBC 7.47 8.30 6.73   RBC 4.00 3.81* 3.74*   HGB 10.3* 9.9* 9.6*   HCT 32.4* 31.9* 31.4*   MCV 81.0 83.7 84.0   MCH 25.8* 26.0* 25.7*   MCHC 31.8 31.0* 30.6*   RDW 15.8* 16.6 16.4    267 270   MPV 8.6* 9.1 9.3     Recent Labs   Lab 07/22/24  0420 07/23/24  1705 07/24/24  0340     139 140   K 4.3 4.3 4.2    106 107   CO2 25 23 25   BUN 29* 29.0* 23.0*   CREATININE 0.85 1.07* 0.83   CALCIUM 10.0 10.6* 10.2   ALBUMIN  --  3.3*  --    ALKPHOS  --  53  --    ALT  --  19  --    AST  --  29  --    BILITOT  --  0.2  --      Microbiology Results (last 7 days)       Procedure Component Value Units Date/Time    Blood Culture #1 **CANNOT BE ORDERED STAT** [[id number]]  (Normal) Collected: 07/23/24 1705    Order Status: Completed Specimen: Blood from Arm, Right Updated: 07/25/24 0801     Blood Culture No Growth At 24 Hours    Blood Culture #2 **CANNOT BE ORDERED STAT** [[id number]]  (Normal) Collected: 07/23/24 1705    Order Status: Completed Specimen: Blood from Arm, Right Updated: 07/25/24 0801     Blood Culture No Growth At 24 Hours    Clostridium Diff Toxin, A & B, EIA [[id number]]     Order Status: Sent Specimen: Stool     Clostridium Diff Toxin, A & B, EIA [[id number]] Collected: 07/24/24 0144    Order Status: Canceled Specimen: Stool Updated: 07/24/24 0157             See below for Radiology    Assessment/Plan:  Jaw osteomyelitis   Diabetes  Hypertension   Diarrhea       Continue IV daptomycin, IV Rocephin, p.o. Flagyl   Consult  for swing bed for 6 weeks of IV antibiotic  Check C diff   Check CBC BMP in a.m.      Patient condition:  Stable/Fair/Guarded/ Serious/ Critical    Anticipated discharge and Disposition:         All diagnosis and differential diagnosis have been reviewed; assessment and plan has been documented; I have personally reviewed the labs and test results that are presently available; I have reviewed the patients medication list; I have reviewed the consulting providers response and recommendations. I have reviewed or attempted to review medical records based upon their availability    All of the patient's questions have been  addressed and answered. Patient's is agreeable to the above stated plan. I will continue to monitor closely and make adjustments  to medical management as needed.    Portions of this note dictated using EMR integrated voice recognition software, and may be subject to voice recognition errors not corrected at proofreading. Please contact writer for clarification if needed.   _____________________________________________________________________    Malnutrition Status:    Scheduled Med:   aspirin  81 mg Oral Daily    carvediloL  6.25 mg Oral BID    cefTRIAXone (Rocephin) IV (PEDS and ADULTS)  2 g Intravenous Q24H    DAPTOmycin (CUBICIN) IV (PEDS and ADULTS)  350 mg Intravenous Q48H    lactobacillus acidophilus & bulgar  1 packet Oral BID    metroNIDAZOLE  500 mg Oral Q8H    mupirocin   Nasal BID      Continuous Infusions:     PRN Meds:    Current Facility-Administered Medications:     dextrose 10%, 12.5 g, Intravenous, PRN    dextrose 10%, 25 g, Intravenous, PRN    glucagon (human recombinant), 1 mg, Intramuscular, PRN    glucose, 16 g, Oral, PRN    glucose, 24 g, Oral, PRN    hydrALAZINE, 10 mg, Intravenous, Q6H PRN    insulin aspart U-100, 0-5 Units, Subcutaneous, QID (AC + HS) PRN    melatonin, 6 mg, Oral, Nightly PRN    sodium chloride 0.9%, 10 mL, Intravenous, PRN     Radiology:  I have personally reviewed the following imaging and agree with the radiologist.     X-Ray Chest AP Portable  Narrative: EXAMINATION:  Chest one view    CLINICAL HISTORY:  PICC line placement    COMPARISON:  None    FINDINGS:  Left-sided intra PICC line tip projects over the right atrium.  There is no visible pneumothorax.  Lungs are clear.  Cardiac silhouette is normal in size.  There is no pleural effusion.  Impression: Left-sided intra PICC line tip projects over the right atrium.    No acute cardiopulmonary process.    Electronically signed by: Mukesh Rick MD  Date:    07/18/2024  Time:    17:40      Corey Allen MD  Department of Hospital Medicine   [facility]   07/25/2024

## 2024-07-25 NOTE — PLAN OF CARE
Per Bella they do not have a bed today and will be discussing pt with MD to see if he is willing to take pt once they have an open bed.  She will let me know.

## 2024-07-25 NOTE — PLAN OF CARE
Per [facility] of [facility] is too expensive for them to take.  Messaged Dr. Dawn to see if there is anything he can change to, that would be more cost efficient.

## 2024-07-26 LAB
ANION GAP SERPL CALC-SCNC: 7 MEQ/L
BASOPHILS # BLD AUTO: 0.04 X10(3)/MCL
BASOPHILS NFR BLD AUTO: 0.4 %
BUN SERPL-MCNC: 22 MG/DL (ref 9.8–20.1)
CALCIUM SERPL-MCNC: 9.2 MG/DL (ref 8.4–10.2)
CHLORIDE SERPL-SCNC: 108 MMOL/L (ref 98–111)
CO2 SERPL-SCNC: 24 MMOL/L (ref 23–31)
CREAT SERPL-MCNC: 0.75 MG/DL (ref 0.55–1.02)
CREAT/UREA NIT SERPL: 29
EOSINOPHIL # BLD AUTO: 0.12 X10(3)/MCL (ref 0–0.9)
EOSINOPHIL NFR BLD AUTO: 1.1 %
ERYTHROCYTE [DISTWIDTH] IN BLOOD BY AUTOMATED COUNT: 16.8 % (ref 11.5–17)
GFR SERPLBLD CREATININE-BSD FMLA CKD-EPI: >60 ML/MIN/1.73/M2
GLUCOSE SERPL-MCNC: 99 MG/DL (ref 75–121)
HCT VFR BLD AUTO: 33.2 % (ref 37–47)
HGB BLD-MCNC: 10.2 G/DL (ref 12–16)
IMM GRANULOCYTES # BLD AUTO: 0.03 X10(3)/MCL (ref 0–0.04)
IMM GRANULOCYTES NFR BLD AUTO: 0.3 %
LYMPHOCYTES # BLD AUTO: 4.42 X10(3)/MCL (ref 0.6–4.6)
LYMPHOCYTES NFR BLD AUTO: 42.2 %
MCH RBC QN AUTO: 25.8 PG (ref 27–31)
MCHC RBC AUTO-ENTMCNC: 30.7 G/DL (ref 33–36)
MCV RBC AUTO: 83.8 FL (ref 80–94)
MONOCYTES # BLD AUTO: 0.63 X10(3)/MCL (ref 0.1–1.3)
MONOCYTES NFR BLD AUTO: 6 %
NEUTROPHILS # BLD AUTO: 5.24 X10(3)/MCL (ref 2.1–9.2)
NEUTROPHILS NFR BLD AUTO: 50 %
PLATELET # BLD AUTO: 257 X10(3)/MCL (ref 130–400)
PMV BLD AUTO: 9.2 FL (ref 7.4–10.4)
POCT GLUCOSE: 147 MG/DL (ref 70–110)
POCT GLUCOSE: 230 MG/DL (ref 70–110)
POCT GLUCOSE: 259 MG/DL (ref 70–110)
POCT GLUCOSE: 90 MG/DL (ref 70–110)
POTASSIUM SERPL-SCNC: 4.8 MMOL/L (ref 3.5–5.1)
RBC # BLD AUTO: 3.96 X10(6)/MCL (ref 4.2–5.4)
SODIUM SERPL-SCNC: 139 MMOL/L (ref 136–145)
WBC # BLD AUTO: 10.48 X10(3)/MCL (ref 4.5–11.5)

## 2024-07-26 PROCEDURE — 85025 COMPLETE CBC W/AUTO DIFF WBC: CPT | Performed by: INTERNAL MEDICINE

## 2024-07-26 PROCEDURE — 63600175 PHARM REV CODE 636 W HCPCS: Performed by: INTERNAL MEDICINE

## 2024-07-26 PROCEDURE — 36415 COLL VENOUS BLD VENIPUNCTURE: CPT | Performed by: INTERNAL MEDICINE

## 2024-07-26 PROCEDURE — 25000003 PHARM REV CODE 250: Performed by: INTERNAL MEDICINE

## 2024-07-26 PROCEDURE — 94761 N-INVAS EAR/PLS OXIMETRY MLT: CPT

## 2024-07-26 PROCEDURE — 11000001 HC ACUTE MED/SURG PRIVATE ROOM

## 2024-07-26 PROCEDURE — 97116 GAIT TRAINING THERAPY: CPT

## 2024-07-26 PROCEDURE — 80048 BASIC METABOLIC PNL TOTAL CA: CPT | Performed by: INTERNAL MEDICINE

## 2024-07-26 PROCEDURE — 97530 THERAPEUTIC ACTIVITIES: CPT

## 2024-07-26 RX ORDER — HYDROCODONE BITARTRATE AND ACETAMINOPHEN 5; 325 MG/1; MG/1
1 TABLET ORAL EVERY 6 HOURS PRN
Status: DISCONTINUED | OUTPATIENT
Start: 2024-07-26 | End: 2024-07-30 | Stop reason: HOSPADM

## 2024-07-26 RX ORDER — ACETAMINOPHEN 325 MG/1
650 TABLET ORAL EVERY 6 HOURS PRN
Status: DISCONTINUED | OUTPATIENT
Start: 2024-07-26 | End: 2024-07-30 | Stop reason: HOSPADM

## 2024-07-26 RX ADMIN — INSULIN ASPART 2 UNITS: 100 INJECTION, SOLUTION INTRAVENOUS; SUBCUTANEOUS at 05:07

## 2024-07-26 RX ADMIN — METRONIDAZOLE 500 MG: 250 TABLET ORAL at 05:07

## 2024-07-26 RX ADMIN — ACETAMINOPHEN 650 MG: 325 TABLET, FILM COATED ORAL at 07:07

## 2024-07-26 RX ADMIN — Medication 1 EACH: at 08:07

## 2024-07-26 RX ADMIN — DAPTOMYCIN 350 MG: 500 INJECTION, POWDER, LYOPHILIZED, FOR SOLUTION INTRAVENOUS at 11:07

## 2024-07-26 RX ADMIN — CEFTRIAXONE SODIUM 2 G: 2 INJECTION, POWDER, FOR SOLUTION INTRAMUSCULAR; INTRAVENOUS at 08:07

## 2024-07-26 RX ADMIN — MUPIROCIN 1 G: 20 OINTMENT TOPICAL at 08:07

## 2024-07-26 RX ADMIN — METRONIDAZOLE 500 MG: 250 TABLET ORAL at 09:07

## 2024-07-26 RX ADMIN — CARVEDILOL 6.25 MG: 6.25 TABLET, FILM COATED ORAL at 08:07

## 2024-07-26 RX ADMIN — METRONIDAZOLE 500 MG: 250 TABLET ORAL at 02:07

## 2024-07-26 RX ADMIN — ASPIRIN 81 MG CHEWABLE TABLET 81 MG: 81 TABLET CHEWABLE at 08:07

## 2024-07-26 RX ADMIN — INSULIN ASPART 1 UNITS: 100 INJECTION, SOLUTION INTRAVENOUS; SUBCUTANEOUS at 08:07

## 2024-07-26 RX ADMIN — DAPTOMYCIN 350 MG: 500 INJECTION, POWDER, LYOPHILIZED, FOR SOLUTION INTRAVENOUS at 12:07

## 2024-07-26 NOTE — PT/OT/SLP PROGRESS
Physical Therapy Treatment    Patient Name:  Augusta James   MRN:  [id number]    Recommendations:     Discharge Recommendations: Moderate Intensity Therapy  Discharge Equipment Recommendations: none  Barriers to discharge:  no caregiver support    Assessment:     Augusta James is a 89 y.o. female admitted with a medical diagnosis of Osteomyelitis of jaw.  She presents with the following impairments/functional limitations: weakness, impaired endurance, impaired functional mobility, gait instability, impaired balance, impaired self care skills .    Rehab Prognosis: Good; patient would benefit from acute skilled PT services to address these deficits and reach maximum level of function.    Recent Surgery: * No surgery found *      Plan:     During this hospitalization, patient to be seen 6 x/week (1-2x daily as needed) to address the identified rehab impairments via gait training, therapeutic activities, therapeutic exercises and progress toward the following goals:    Plan of Care Expires:       Subjective     Chief Complaint: weakness  Patient/Family Comments/goals: to go to rehab upon discharge from the hospital.  Pain/Comfort:  Pain Rating 1: 0/10      Objective:     Communicated with nurse prior to session.  Patient found up in chair with pulse ox (continuous), telemetry, blood pressure cuff, PureWick upon PT entry to room.     General Precautions: Standard, fall  Orthopedic Precautions: N/A  Braces: N/A  Respiratory Status: Room air     Functional Mobility:  Bed Mobility:     Sit to Supine: moderate assistance  Transfers:     Sit to Stand:  minimum assistance with rolling walker  Bed to Chair: minimum assistance with  rolling walker  using  Step Transfer  Toilet Transfer: moderate assistance with  rolling walker  using  Step Transfer  Gait: Patient ambulated ~150' with RW and min A, no SOB, good awareness of surroundings - just mild weakness and endurance deficits limiting her standing tolerance during activity.  [facility]      AM-PAC 6 CLICK MOBILITY          Treatment & Education:  Please see above.  Patient is a great candidate for continued therapy via SNF or LTAC to improve her independence and safety with mobility and ADLs prior to returning home.    Patient left HOB elevated with all lines intact, call button in reach, and nurse notified..    GOALS:   Multidisciplinary Problems       Physical Therapy Goals          Problem: Physical Therapy    Goal Priority Disciplines Outcome Goal Variances Interventions   Physical Therapy Goal     PT, PT/OT Progressing     Description: Goals to be met by: discharge     Patient will increase functional independence with mobility by performin. Bed to chair transfer with Modified [place] using Rolling Walker  2. Gait  x 150 feet with Modified [place] using Rolling Walker.                          Time Tracking:     PT Received On: 24  PT Start Time: 955     PT Stop Time: 1006  PT Total Time (min): 11 min     Billable Minutes: Gait Training 11    Treatment Type: Treatment  PT/PTA: PT           2024

## 2024-07-26 NOTE — PLAN OF CARE
PT eval done yesterday.  JUAN Skelton at [facility] is out.  Messaged JUAN Smith at [facility], to call in [facility] on pt.

## 2024-07-26 NOTE — PT/OT/SLP PROGRESS
Physical Therapy Treatment    Patient Name:  Augusta James   MRN:  [id number]    Recommendations:     Discharge Recommendations: Moderate Intensity Therapy  Discharge Equipment Recommendations: none  Barriers to discharge:  no caregiver support    Assessment:     Augusta James is a 89 y.o. female admitted with a medical diagnosis of Osteomyelitis of jaw.  She presents with the following impairments/functional limitations: weakness, impaired endurance, impaired functional mobility, gait instability, impaired balance, impaired self care skills .    Rehab Prognosis: Good; patient would benefit from acute skilled PT services to address these deficits and reach maximum level of function.    Recent Surgery: * No surgery found *      Plan:     During this hospitalization, patient to be seen 6 x/week (1-2x daily as needed) to address the identified rehab impairments via gait training, therapeutic activities, therapeutic exercises and progress toward the following goals:    Plan of Care Expires:       Subjective     Chief Complaint: weakness  Patient/Family Comments/goals: to go to rehab upon discharge from the hospital.  Pain/Comfort:  Pain Rating 1: other (see comments) (Patient reported discomfort in her gluteal area in sitting)  Pain Addressed 1: Nurse notified, Reposition      Objective:     Communicated with nurse prior to session.  Patient found up in chair with pulse ox (continuous), telemetry, blood pressure cuff, PureWick upon PT entry to room.     General Precautions: Standard, fall  Orthopedic Precautions: N/A  Braces: N/A  Respiratory Status: Room air     Functional Mobility:  Bed Mobility:     Sit to Supine: moderate assistance  Transfers:     Sit to Stand:  minimum assistance with rolling walker  Bed to Chair: minimum assistance with  rolling walker  using  Step Transfer  Toilet Transfer: moderate assistance with  rolling walker  using  Step Transfer  Gait: Patient ambulated ~150' again with RW and min A, no SOB,  good awareness of surroundings - just mild weakness and endurance deficits limiting her standing tolerance during activity. Vitals WFL      AM-PAC 6 CLICK MOBILITY          Treatment & Education:  Please see above.  Patient is a great candidate for continued therapy via SNF or LTAC to improve her independence and safety with mobility and ADLs prior to returning home. Patient continues to require total assist for LB hygiene mgmt due to impaired standing balance. Vitals monitored and WFL. Pillow placed in chair to assist with her comfort regarding her gluteal pain.    Patient left up in chair with all lines intact, call button in reach, and nurse notified..    GOALS:   Multidisciplinary Problems       Physical Therapy Goals          Problem: Physical Therapy    Goal Priority Disciplines Outcome Goal Variances Interventions   Physical Therapy Goal     PT, PT/OT Progressing     Description: Goals to be met by: discharge     Patient will increase functional independence with mobility by performin. Bed to chair transfer with Modified [place] using Rolling Walker  2. Gait  x 150 feet with Modified [place] using Rolling Walker.                          Time Tracking:     PT Received On: 24  PT Start Time: 1322     PT Stop Time: 1350  PT Total Time (min): 28 min     Billable Minutes: Gait Training 13 and Therapeutic Activity 15    Treatment Type: Treatment  PT/PTA: PT           2024

## 2024-07-26 NOTE — PROGRESS NOTES
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 Patient is unable to give herself the injections as home health is only available every other day. So she comes back to hospital to see if she can be discharged to long term acute care during the duration of antibiotic use (a total of 6 weeks). Patient notes complaints of diarrhea that is likely an adverse effect of triple antibiotic regimen.     July 24, 2024-osteomyelitis was treated with IV daptomycin, IV Rocephin, no fever, no shortness for breath    July 25, 2024-complain of diarrhea 3 times last 24 hours, no nausea, no vomiting, no abdominal pain, no fever    July 26, 2024-no complaints, no nausea, no vomiting, no abdominal pain, waiting for skilled nursing facility     Case was discussed with patient's nurse and  on the floor.    Objective/physical exam:  General: In no acute distress, afebrile  Chest: Clear to auscultation bilaterally  Heart: RRR, +S1, S2, no appreciable murmur  Abdomen: Soft, nontender, BS +  MSK: Warm, no lower extremity edema, no clubbing or cyanosis  Neurologic: Alert and oriented x4, Cranial nerve II-XII intact, Strength 5/5 in all 4 extremities    VITAL SIGNS: 24 HRS MIN & MAX LAST   Temp  Min: 97.5 °F (36.4 °C)  Max: 97.9 °F (36.6 °C) 97.5 °F (36.4 °C)   BP  Min: 128/54  Max: 157/60 (!) 140/60   Pulse  Min: 52  Max: 91  (!) 56   Resp  Min: 1  Max: 26 15   SpO2  Min: 89 %  Max: 100 % 98 %     I have reviewed the following labs:  Recent Labs   Lab 07/23/24  1705 07/24/24  0340 07/26/24  0336   WBC 8.30 6.73 10.48   RBC 3.81* 3.74* 3.96*   HGB 9.9* 9.6* 10.2*   HCT 31.9* 31.4* 33.2*   MCV 83.7 84.0 83.8   MCH 26.0* 25.7* 25.8*   MCHC 31.0* 30.6* 30.7*   RDW  16.6 16.4 16.8    270 257   MPV 9.1 9.3 9.2     Recent Labs   Lab 07/23/24  1705 07/24/24  0340 07/26/24  0336    140 139   K 4.3 4.2 4.8    107 108   CO2 23 25 24   BUN 29.0* 23.0* 22.0*   CREATININE 1.07* 0.83 0.75   CALCIUM 10.6* 10.2 9.2   ALBUMIN 3.3*  --   --    ALKPHOS 53  --   --    ALT 19  --   --    AST 29  --   --    BILITOT 0.2  --   --      Microbiology Results (last 7 days)       Procedure Component Value Units Date/Time    Blood Culture #1 **CANNOT BE ORDERED STAT** [[id number]]  (Normal) Collected: 07/23/24 1705    Order Status: Completed Specimen: Blood from Arm, Right Updated: 07/26/24 0801     Blood Culture No Growth At 48 Hours    Blood Culture #2 **CANNOT BE ORDERED STAT** [[id number]]  (Normal) Collected: 07/23/24 1705    Order Status: Completed Specimen: Blood from Arm, Right Updated: 07/26/24 0801     Blood Culture No Growth At 48 Hours    Clostridium Diff Toxin, A & B, EIA [[id number]]     Order Status: Canceled Specimen: Stool     Clostridium Diff Toxin, A & B, EIA [[id number]] Collected: 07/24/24 0144    Order Status: Canceled Specimen: Stool Updated: 07/24/24 0157             See below for Radiology    Assessment/Plan:  Jaw osteomyelitis   Diabetes  Hypertension   Diarrhea       Continue IV daptomycin, IV Rocephin, p.o. Flagyl   Consult  for skilled nursing facility for 6 weeks of IV antibiotic  Check CBC BMP in a.m.      Patient condition:  Stable/Fair/Guarded/ Serious/ Critical    Anticipated discharge and Disposition:         All diagnosis and differential diagnosis have been reviewed; assessment and plan has been documented; I have personally reviewed the labs and test results that are presently available; I have reviewed the patients medication list; I have reviewed the consulting providers response and recommendations. I have reviewed or attempted to review medical records based upon their availability    All of the patient's questions have been   addressed and answered. Patient's is agreeable to the above stated plan. I will continue to monitor closely and make adjustments to medical management as needed.    Portions of this note dictated using EMR integrated voice recognition software, and may be subject to voice recognition errors not corrected at proofreading. Please contact writer for clarification if needed.   _____________________________________________________________________    Malnutrition Status:    Scheduled Med:   aspirin  81 mg Oral Daily    carvediloL  6.25 mg Oral BID    cefTRIAXone (Rocephin) IV (PEDS and ADULTS)  2 g Intravenous Q24H    DAPTOmycin (CUBICIN) IV (PEDS and ADULTS)  350 mg Intravenous Q48H    lactobacillus acidophilus & bulgar  1 packet Oral BID    metroNIDAZOLE  500 mg Oral Q8H    mupirocin   Nasal BID      Continuous Infusions:     PRN Meds:    Current Facility-Administered Medications:     dextrose 10%, 12.5 g, Intravenous, PRN    dextrose 10%, 25 g, Intravenous, PRN    glucagon (human recombinant), 1 mg, Intramuscular, PRN    glucose, 16 g, Oral, PRN    glucose, 24 g, Oral, PRN    hydrALAZINE, 10 mg, Intravenous, Q6H PRN    insulin aspart U-100, 0-5 Units, Subcutaneous, QID (AC + HS) PRN    melatonin, 6 mg, Oral, Nightly PRN    sodium chloride 0.9%, 10 mL, Intravenous, PRN     Radiology:  I have personally reviewed the following imaging and agree with the radiologist.     X-Ray Chest AP Portable  Narrative: EXAMINATION:  Chest one view    CLINICAL HISTORY:  PICC line placement    COMPARISON:  None    FINDINGS:  Left-sided intra PICC line tip projects over the right atrium.  There is no visible pneumothorax.  Lungs are clear.  Cardiac silhouette is normal in size.  There is no pleural effusion.  Impression: Left-sided intra PICC line tip projects over the right atrium.    No acute cardiopulmonary process.    Electronically signed by: Mukesh Rick MD  Date:    07/18/2024  Time:    17:40      Corey Allen MD  Department of  [facility] Medicine   [facility]   07/26/2024

## 2024-07-27 LAB
POCT GLUCOSE: 111 MG/DL (ref 70–110)
POCT GLUCOSE: 147 MG/DL (ref 70–110)
POCT GLUCOSE: 184 MG/DL (ref 70–110)
POCT GLUCOSE: 205 MG/DL (ref 70–110)

## 2024-07-27 PROCEDURE — 25000003 PHARM REV CODE 250: Performed by: INTERNAL MEDICINE

## 2024-07-27 PROCEDURE — 11000001 HC ACUTE MED/SURG PRIVATE ROOM

## 2024-07-27 PROCEDURE — 97116 GAIT TRAINING THERAPY: CPT

## 2024-07-27 PROCEDURE — 94761 N-INVAS EAR/PLS OXIMETRY MLT: CPT

## 2024-07-27 PROCEDURE — 63600175 PHARM REV CODE 636 W HCPCS: Performed by: INTERNAL MEDICINE

## 2024-07-27 PROCEDURE — 97530 THERAPEUTIC ACTIVITIES: CPT

## 2024-07-27 RX ADMIN — ASPIRIN 81 MG CHEWABLE TABLET 81 MG: 81 TABLET CHEWABLE at 08:07

## 2024-07-27 RX ADMIN — CARVEDILOL 6.25 MG: 6.25 TABLET, FILM COATED ORAL at 08:07

## 2024-07-27 RX ADMIN — Medication 1 EACH: at 08:07

## 2024-07-27 RX ADMIN — MUPIROCIN 1 G: 20 OINTMENT TOPICAL at 09:07

## 2024-07-27 RX ADMIN — METRONIDAZOLE 500 MG: 250 TABLET ORAL at 09:07

## 2024-07-27 RX ADMIN — METRONIDAZOLE 500 MG: 250 TABLET ORAL at 01:07

## 2024-07-27 RX ADMIN — MUPIROCIN 1 G: 20 OINTMENT TOPICAL at 08:07

## 2024-07-27 RX ADMIN — HYDRALAZINE HYDROCHLORIDE 10 MG: 20 INJECTION INTRAMUSCULAR; INTRAVENOUS at 02:07

## 2024-07-27 RX ADMIN — CARVEDILOL 6.25 MG: 6.25 TABLET, FILM COATED ORAL at 09:07

## 2024-07-27 RX ADMIN — Medication 1 EACH: at 09:07

## 2024-07-27 RX ADMIN — CEFTRIAXONE SODIUM 2 G: 2 INJECTION, POWDER, FOR SOLUTION INTRAMUSCULAR; INTRAVENOUS at 09:07

## 2024-07-27 RX ADMIN — METRONIDAZOLE 500 MG: 250 TABLET ORAL at 05:07

## 2024-07-27 NOTE — PROGRESS NOTES
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 Patient is unable to give herself the injections as home health is only available every other day. So she comes back to hospital to see if she can be discharged to long term acute care during the duration of antibiotic use (a total of 6 weeks). Patient notes complaints of diarrhea that is likely an adverse effect of triple antibiotic regimen.     July 24, 2024-osteomyelitis was treated with IV daptomycin, IV Rocephin, no fever, no shortness for breath    July 25, 2024-complain of diarrhea 3 times last 24 hours, no nausea, no vomiting, no abdominal pain, no fever    July 26, 2024-no complaints, no nausea, no vomiting, no abdominal pain, waiting for skilled nursing facility     July 27 2024-the patient is doing well, no fever, no shortness for breath, waiting for skilled nursing facility    Case was discussed with patient's nurse and  on the floor.    Objective/physical exam:  General: In no acute distress, afebrile  Chest: Clear to auscultation bilaterally  Heart: RRR, +S1, S2, no appreciable murmur  Abdomen: Soft, nontender, BS +  MSK: Warm, no lower extremity edema, no clubbing or cyanosis  Neurologic: Alert and oriented x4, Cranial nerve II-XII intact, Strength 5/5 in all 4 extremities    VITAL SIGNS: 24 HRS MIN & MAX LAST   Temp  Min: 97.5 °F (36.4 °C)  Max: 98 °F (36.7 °C) 98 °F (36.7 °C)   BP  Min: 125/70  Max: 172/78 (!) 170/69   Pulse  Min: 55  Max: 75  75   Resp  Min: 12  Max: 21 (!) 21   SpO2  Min: 98 %  Max: 100 % 99 %     I have reviewed the following labs:  Recent Labs   Lab 07/23/24  1705 07/24/24  0340 07/26/24  0336   WBC 8.30 6.73 10.48   RBC 3.81* 3.74* 3.96*   HGB 9.9*  9.6* 10.2*   HCT 31.9* 31.4* 33.2*   MCV 83.7 84.0 83.8   MCH 26.0* 25.7* 25.8*   MCHC 31.0* 30.6* 30.7*   RDW 16.6 16.4 16.8    270 257   MPV 9.1 9.3 9.2     Recent Labs   Lab 07/23/24  1705 07/24/24  0340 07/26/24  0336    140 139   K 4.3 4.2 4.8    107 108   CO2 23 25 24   BUN 29.0* 23.0* 22.0*   CREATININE 1.07* 0.83 0.75   CALCIUM 10.6* 10.2 9.2   ALBUMIN 3.3*  --   --    ALKPHOS 53  --   --    ALT 19  --   --    AST 29  --   --    BILITOT 0.2  --   --      Microbiology Results (last 7 days)       Procedure Component Value Units Date/Time    Blood Culture #1 **CANNOT BE ORDERED STAT** [[id number]]  (Normal) Collected: 07/23/24 1705    Order Status: Completed Specimen: Blood from Arm, Right Updated: 07/27/24 0800     Blood Culture No Growth At 72 Hours    Blood Culture #2 **CANNOT BE ORDERED STAT** [[id number]]  (Normal) Collected: 07/23/24 1705    Order Status: Completed Specimen: Blood from Arm, Right Updated: 07/27/24 0800     Blood Culture No Growth At 72 Hours    Clostridium Diff Toxin, A & B, EIA [[id number]]     Order Status: Canceled Specimen: Stool     Clostridium Diff Toxin, A & B, EIA [[id number]] Collected: 07/24/24 0144    Order Status: Canceled Specimen: Stool Updated: 07/24/24 0157             See below for Radiology    Assessment/Plan:  Jaw osteomyelitis   Diabetes  Hypertension   Diarrhea       Continue IV daptomycin, IV Rocephin, p.o. Flagyl   Consult  for skilled nursing facility for 6 weeks of IV antibiotic  Check CBC BMP in a.m.      Patient condition:  Stable/Fair/Guarded/ Serious/ Critical    Anticipated discharge and Disposition:         All diagnosis and differential diagnosis have been reviewed; assessment and plan has been documented; I have personally reviewed the labs and test results that are presently available; I have reviewed the patients medication list; I have reviewed the consulting providers response and recommendations. I have reviewed or  attempted to review medical records based upon their availability    All of the patient's questions have been  addressed and answered. Patient's is agreeable to the above stated plan. I will continue to monitor closely and make adjustments to medical management as needed.    Portions of this note dictated using EMR integrated voice recognition software, and may be subject to voice recognition errors not corrected at proofreading. Please contact writer for clarification if needed.   _____________________________________________________________________    Malnutrition Status:    Scheduled Med:   aspirin  81 mg Oral Daily    carvediloL  6.25 mg Oral BID    cefTRIAXone (Rocephin) IV (PEDS and ADULTS)  2 g Intravenous Q24H    DAPTOmycin (CUBICIN) IV (PEDS and ADULTS)  350 mg Intravenous Q24H    lactobacillus acidophilus & bulgar  1 packet Oral BID    metroNIDAZOLE  500 mg Oral Q8H    mupirocin   Nasal BID      Continuous Infusions:     PRN Meds:    Current Facility-Administered Medications:     acetaminophen, 650 mg, Oral, Q6H PRN    dextrose 10%, 12.5 g, Intravenous, PRN    dextrose 10%, 25 g, Intravenous, PRN    glucagon (human recombinant), 1 mg, Intramuscular, PRN    glucose, 16 g, Oral, PRN    glucose, 24 g, Oral, PRN    hydrALAZINE, 10 mg, Intravenous, Q6H PRN    HYDROcodone-acetaminophen, 1 tablet, Oral, Q6H PRN    insulin aspart U-100, 0-5 Units, Subcutaneous, QID (AC + HS) PRN    melatonin, 6 mg, Oral, Nightly PRN    sodium chloride 0.9%, 10 mL, Intravenous, PRN     Radiology:  I have personally reviewed the following imaging and agree with the radiologist.     X-Ray Chest AP Portable  Narrative: EXAMINATION:  Chest one view    CLINICAL HISTORY:  PICC line placement    COMPARISON:  None    FINDINGS:  Left-sided intra PICC line tip projects over the right atrium.  There is no visible pneumothorax.  Lungs are clear.  Cardiac silhouette is normal in size.  There is no pleural effusion.  Impression: Left-sided intra  PICC line tip projects over the right atrium.    No acute cardiopulmonary process.    Electronically signed by: Mukesh Rick MD  Date:    07/18/2024  Time:    17:40      Corey Allen MD  Department of Hospital Medicine   [facility]   07/27/2024

## 2024-07-27 NOTE — PT/OT/SLP PROGRESS
Physical Therapy Treatment    Patient Name:  Augusta James   MRN:  [id number]    Recommendations:     Discharge Recommendations: Moderate Intensity Therapy  Discharge Equipment Recommendations: none  Barriers to discharge:  medical condition    Assessment:     Augusta James is a 89 y.o. female admitted with a medical diagnosis of Osteomyelitis of jaw.  She presents with the following impairments/functional limitations: weakness, impaired endurance, impaired functional mobility, gait instability, impaired balance, impaired self care skills .    Rehab Prognosis: Good; patient would benefit from acute skilled PT services to address these deficits and reach maximum level of function.    Recent Surgery: * No surgery found *      Plan:     During this hospitalization, patient to be seen 6 x/week (1-2x daily as needed) to address the identified rehab impairments via gait training, therapeutic activities, therapeutic exercises and progress toward the following goals:    Plan of Care Expires:       Subjective     Chief Complaint: fatigue and weakness.  Patient/Family Comments/goals: she plans return home to  who is also not independent.  Pain/Comfort:         Objective:     Communicated with nursg prior to session.  Patient found up in chair with pulse ox (continuous), telemetry, blood pressure cuff upon PT entry to room.     General Precautions: Standard, fall  Orthopedic Precautions: N/A  Braces: N/A  Respiratory Status: Room air     Functional Mobility:  Transfers:     Sit to Stand:  supervision with rolling walker  Gait: RW with supervision      AM-PAC 6 CLICK MOBILITY  Sitting down on and standing up from a chair with arms (e.g., wheelchair, bedside commode, etc.): 3  Moving to and from a bed to a chair (including a wheelchair)?: 3       Treatment & Education:  Instruction to improve safety of ambulation with RW and transfer trn to improve independence level.    Patient left up in chair with all lines intact and call  button in reach..    GOALS:   Multidisciplinary Problems       Physical Therapy Goals          Problem: Physical Therapy    Goal Priority Disciplines Outcome Goal Variances Interventions   Physical Therapy Goal     PT, PT/OT Progressing     Description: Goals to be met by: discharge     Patient will increase functional independence with mobility by performin. Bed to chair transfer with Modified [place] using Rolling Walker  2. Gait  x 150 feet with Modified [place] using Rolling Walker.                          Time Tracking:     PT Received On:    PT Start Time: 1300     PT Stop Time: 1330  PT Total Time (min): 30 min     Billable Minutes: Gait Training 15 and Therapeutic Activity 15    Treatment Type: Treatment  PT/PTA: PT           2024

## 2024-07-28 LAB
POCT GLUCOSE: 195 MG/DL (ref 70–110)
POCT GLUCOSE: 260 MG/DL (ref 70–110)
POCT GLUCOSE: 272 MG/DL (ref 70–110)

## 2024-07-28 PROCEDURE — 25000003 PHARM REV CODE 250: Performed by: INTERNAL MEDICINE

## 2024-07-28 PROCEDURE — 11000001 HC ACUTE MED/SURG PRIVATE ROOM

## 2024-07-28 PROCEDURE — 63600175 PHARM REV CODE 636 W HCPCS: Performed by: INTERNAL MEDICINE

## 2024-07-28 PROCEDURE — 94761 N-INVAS EAR/PLS OXIMETRY MLT: CPT

## 2024-07-28 RX ADMIN — CARVEDILOL 6.25 MG: 6.25 TABLET, FILM COATED ORAL at 08:07

## 2024-07-28 RX ADMIN — METRONIDAZOLE 500 MG: 250 TABLET ORAL at 08:07

## 2024-07-28 RX ADMIN — METRONIDAZOLE 500 MG: 250 TABLET ORAL at 03:07

## 2024-07-28 RX ADMIN — INSULIN ASPART 1 UNITS: 100 INJECTION, SOLUTION INTRAVENOUS; SUBCUTANEOUS at 09:07

## 2024-07-28 RX ADMIN — Medication 1 EACH: at 08:07

## 2024-07-28 RX ADMIN — ASPIRIN 81 MG CHEWABLE TABLET 81 MG: 81 TABLET CHEWABLE at 08:07

## 2024-07-28 RX ADMIN — MUPIROCIN 1 G: 20 OINTMENT TOPICAL at 08:07

## 2024-07-28 RX ADMIN — Medication 6 MG: at 08:07

## 2024-07-28 RX ADMIN — CEFTRIAXONE SODIUM 2 G: 2 INJECTION, POWDER, FOR SOLUTION INTRAMUSCULAR; INTRAVENOUS at 08:07

## 2024-07-28 RX ADMIN — DAPTOMYCIN 350 MG: 500 INJECTION, POWDER, LYOPHILIZED, FOR SOLUTION INTRAVENOUS at 01:07

## 2024-07-28 RX ADMIN — INSULIN ASPART 3 UNITS: 100 INJECTION, SOLUTION INTRAVENOUS; SUBCUTANEOUS at 11:07

## 2024-07-28 NOTE — PROGRESS NOTES
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 Patient is unable to give herself the injections as home health is only available every other day. So she comes back to hospital to see if she can be discharged to long term acute care during the duration of antibiotic use (a total of 6 weeks). Patient notes complaints of diarrhea that is likely an adverse effect of triple antibiotic regimen.     July 24, 2024-osteomyelitis was treated with IV daptomycin, IV Rocephin, no fever, no shortness for breath    July 25, 2024-complain of diarrhea 3 times last 24 hours, no nausea, no vomiting, no abdominal pain, no fever    July 26, 2024-no complaints, no nausea, no vomiting, no abdominal pain, waiting for skilled nursing facility     July 27 2024-the patient is doing well, no fever, no shortness for breath, waiting for skilled nursing facility    July 28, 2024-no complaints, no fever, no shortness for breath, waiting for skilled nursing facility    Objective/physical exam:  General: In no acute distress, afebrile  Chest: Clear to auscultation bilaterally  Heart: RRR, +S1, S2, no appreciable murmur  Abdomen: Soft, nontender, BS +  MSK: Warm, no lower extremity edema, no clubbing or cyanosis  Neurologic: Alert and oriented x4, Cranial nerve II-XII intact, Strength 5/5 in all 4 extremities    VITAL SIGNS: 24 HRS MIN & MAX LAST   Temp  Min: 97.3 °F (36.3 °C)  Max: 98.2 °F (36.8 °C) 97.7 °F (36.5 °C)   BP  Min: 127/47  Max: 176/69 (!) 169/68   Pulse  Min: 60  Max: 80  80   Resp  Min: 15  Max: 18 18   SpO2  Min: 96 %  Max: 100 % 97 %     I have reviewed the following labs:  Recent Labs   Lab 07/23/24  1705 07/24/24  0340 07/26/24  0336   WBC 8.30 6.73 10.48    RBC 3.81* 3.74* 3.96*   HGB 9.9* 9.6* 10.2*   HCT 31.9* 31.4* 33.2*   MCV 83.7 84.0 83.8   MCH 26.0* 25.7* 25.8*   MCHC 31.0* 30.6* 30.7*   RDW 16.6 16.4 16.8    270 257   MPV 9.1 9.3 9.2     Recent Labs   Lab 07/23/24  1705 07/24/24  0340 07/26/24  0336    140 139   K 4.3 4.2 4.8    107 108   CO2 23 25 24   BUN 29.0* 23.0* 22.0*   CREATININE 1.07* 0.83 0.75   CALCIUM 10.6* 10.2 9.2   ALBUMIN 3.3*  --   --    ALKPHOS 53  --   --    ALT 19  --   --    AST 29  --   --    BILITOT 0.2  --   --      Microbiology Results (last 7 days)       Procedure Component Value Units Date/Time    Blood Culture #1 **CANNOT BE ORDERED STAT** [[id number]]  (Normal) Collected: 07/23/24 1705    Order Status: Completed Specimen: Blood from Arm, Right Updated: 07/28/24 0800     Blood Culture No Growth At 96 Hours    Blood Culture #2 **CANNOT BE ORDERED STAT** [[id number]]  (Normal) Collected: 07/23/24 1705    Order Status: Completed Specimen: Blood from Arm, Right Updated: 07/28/24 0800     Blood Culture No Growth At 96 Hours    Clostridium Diff Toxin, A & B, EIA [[id number]]     Order Status: Canceled Specimen: Stool     Clostridium Diff Toxin, A & B, EIA [[id number]] Collected: 07/24/24 0144    Order Status: Canceled Specimen: Stool Updated: 07/24/24 0157             See below for Radiology    Assessment/Plan:  Jaw osteomyelitis   Diabetes  Hypertension   Diarrhea       Continue IV daptomycin, IV Rocephin, p.o. Flagyl   Consult  for skilled nursing facility for 6 weeks of IV antibiotic  Check CBC BMP in a.m.      Patient condition:  Stable/Fair/Guarded/ Serious/ Critical    Anticipated discharge and Disposition:         All diagnosis and differential diagnosis have been reviewed; assessment and plan has been documented; I have personally reviewed the labs and test results that are presently available; I have reviewed the patients medication list; I have reviewed the consulting providers response and  recommendations. I have reviewed or attempted to review medical records based upon their availability    All of the patient's questions have been  addressed and answered. Patient's is agreeable to the above stated plan. I will continue to monitor closely and make adjustments to medical management as needed.    Portions of this note dictated using EMR integrated voice recognition software, and may be subject to voice recognition errors not corrected at proofreading. Please contact writer for clarification if needed.   _____________________________________________________________________    Malnutrition Status:    Scheduled Med:   aspirin  81 mg Oral Daily    carvediloL  6.25 mg Oral BID    cefTRIAXone (Rocephin) IV (PEDS and ADULTS)  2 g Intravenous Q24H    DAPTOmycin (CUBICIN) IV (PEDS and ADULTS)  350 mg Intravenous Q24H    lactobacillus acidophilus & bulgar  1 packet Oral BID    metroNIDAZOLE  500 mg Oral Q8H      Continuous Infusions:     PRN Meds:    Current Facility-Administered Medications:     acetaminophen, 650 mg, Oral, Q6H PRN    dextrose 10%, 12.5 g, Intravenous, PRN    dextrose 10%, 25 g, Intravenous, PRN    glucagon (human recombinant), 1 mg, Intramuscular, PRN    glucose, 16 g, Oral, PRN    glucose, 24 g, Oral, PRN    hydrALAZINE, 10 mg, Intravenous, Q6H PRN    HYDROcodone-acetaminophen, 1 tablet, Oral, Q6H PRN    insulin aspart U-100, 0-5 Units, Subcutaneous, QID (AC + HS) PRN    melatonin, 6 mg, Oral, Nightly PRN    sodium chloride 0.9%, 10 mL, Intravenous, PRN     Radiology:  I have personally reviewed the following imaging and agree with the radiologist.     X-Ray Chest AP Portable  Narrative: EXAMINATION:  Chest one view    CLINICAL HISTORY:  PICC line placement    COMPARISON:  None    FINDINGS:  Left-sided intra PICC line tip projects over the right atrium.  There is no visible pneumothorax.  Lungs are clear.  Cardiac silhouette is normal in size.  There is no pleural effusion.  Impression:  Left-sided intra PICC line tip projects over the right atrium.    No acute cardiopulmonary process.    Electronically signed by: Mukesh Rick MD  Date:    07/18/2024  Time:    17:40      Corey Allen MD  Department of Hospital Medicine   [facility]   07/28/2024

## 2024-07-29 LAB
ANION GAP SERPL CALC-SCNC: 5 MEQ/L
BACTERIA BLD CULT: NORMAL
BACTERIA BLD CULT: NORMAL
BUN SERPL-MCNC: 17 MG/DL (ref 9.8–20.1)
CALCIUM SERPL-MCNC: 9.9 MG/DL (ref 8.4–10.2)
CHLORIDE SERPL-SCNC: 109 MMOL/L (ref 98–111)
CO2 SERPL-SCNC: 27 MMOL/L (ref 23–31)
CREAT SERPL-MCNC: 0.8 MG/DL (ref 0.55–1.02)
CREAT/UREA NIT SERPL: 21
GFR SERPLBLD CREATININE-BSD FMLA CKD-EPI: >60 ML/MIN/1.73/M2
GLUCOSE SERPL-MCNC: 213 MG/DL (ref 75–121)
POCT GLUCOSE: 109 MG/DL (ref 70–110)
POCT GLUCOSE: 188 MG/DL (ref 70–110)
POCT GLUCOSE: 195 MG/DL (ref 70–110)
POCT GLUCOSE: 336 MG/DL (ref 70–110)
POTASSIUM SERPL-SCNC: 4.5 MMOL/L (ref 3.5–5.1)
SODIUM SERPL-SCNC: 141 MMOL/L (ref 136–145)

## 2024-07-29 PROCEDURE — 25000003 PHARM REV CODE 250: Performed by: INTERNAL MEDICINE

## 2024-07-29 PROCEDURE — 63600175 PHARM REV CODE 636 W HCPCS: Performed by: INTERNAL MEDICINE

## 2024-07-29 PROCEDURE — 97530 THERAPEUTIC ACTIVITIES: CPT

## 2024-07-29 PROCEDURE — 80048 BASIC METABOLIC PNL TOTAL CA: CPT | Performed by: INTERNAL MEDICINE

## 2024-07-29 PROCEDURE — 11000001 HC ACUTE MED/SURG PRIVATE ROOM

## 2024-07-29 PROCEDURE — 94761 N-INVAS EAR/PLS OXIMETRY MLT: CPT

## 2024-07-29 PROCEDURE — 36415 COLL VENOUS BLD VENIPUNCTURE: CPT | Performed by: INTERNAL MEDICINE

## 2024-07-29 RX ADMIN — HYDROCODONE BITARTRATE AND ACETAMINOPHEN 1 TABLET: 5; 325 TABLET ORAL at 09:07

## 2024-07-29 RX ADMIN — METRONIDAZOLE 500 MG: 250 TABLET ORAL at 09:07

## 2024-07-29 RX ADMIN — INSULIN ASPART 4 UNITS: 100 INJECTION, SOLUTION INTRAVENOUS; SUBCUTANEOUS at 05:07

## 2024-07-29 RX ADMIN — METRONIDAZOLE 500 MG: 250 TABLET ORAL at 01:07

## 2024-07-29 RX ADMIN — METRONIDAZOLE 500 MG: 250 TABLET ORAL at 06:07

## 2024-07-29 RX ADMIN — CARVEDILOL 6.25 MG: 6.25 TABLET, FILM COATED ORAL at 09:07

## 2024-07-29 RX ADMIN — DAPTOMYCIN 350 MG: 500 INJECTION, POWDER, LYOPHILIZED, FOR SOLUTION INTRAVENOUS at 12:07

## 2024-07-29 RX ADMIN — Medication 1 EACH: at 09:07

## 2024-07-29 RX ADMIN — HYDRALAZINE HYDROCHLORIDE 10 MG: 20 INJECTION INTRAMUSCULAR; INTRAVENOUS at 04:07

## 2024-07-29 RX ADMIN — Medication 6 MG: at 09:07

## 2024-07-29 RX ADMIN — ASPIRIN 81 MG CHEWABLE TABLET 81 MG: 81 TABLET CHEWABLE at 09:07

## 2024-07-29 RX ADMIN — CEFTRIAXONE SODIUM 2 G: 2 INJECTION, POWDER, FOR SOLUTION INTRAMUSCULAR; INTRAVENOUS at 09:07

## 2024-07-29 NOTE — PT/OT/SLP PROGRESS
Physical Therapy Treatment    Patient Name:  Augusta James   MRN:  [id number]    Recommendations:     Discharge Recommendations: Moderate Intensity Therapy (Would greatly benefit from SNF)  Discharge Equipment Recommendations: none  Barriers to discharge:  no caregiver support    Assessment:     Augusta James is a 89 y.o. female admitted with a medical diagnosis of Osteomyelitis of jaw.  She presents with the following impairments/functional limitations: weakness, impaired endurance, impaired functional mobility, gait instability, impaired balance, impaired self care skills .    Rehab Prognosis: Good; patient would benefit from acute skilled PT services to address these deficits and reach maximum level of function.    Recent Surgery: * No surgery found *      Plan:     During this hospitalization, patient to be seen 6 x/week (1-2x daily as needed) to address the identified rehab impairments via gait training, therapeutic activities, therapeutic exercises and progress toward the following goals:    Plan of Care Expires:       Subjective     Chief Complaint: weakness  Patient/Family Comments/goals: to go to rehab upon discharge from the hospital.  Pain/Comfort:  Pain Rating 1: 0/10      Objective:     Communicated with nurse prior to session.  Patient found up in chair with telemetry, PureWick upon PT entry to room.     General Precautions: Standard, fall  Orthopedic Precautions: N/A  Braces: N/A  Respiratory Status: Room air     Functional Mobility:  Bed Mobility:     Supine to Sit: moderate assistance  Transfers:     Sit to Stand:  minimum assistance with rolling walker  Bed to Chair: minimum assistance with  rolling walker  using  Step Transfer  Gait: Patient ambulated ~10ft with RW and min A, was greatly fatigued prior to ambulation due to having had an incontinent bowel movement and required total assist for LB/pericare/hygiene management due to her impaired standing balance and gross weakness.      AM-PAC 6 CLICK  MOBILITY          Treatment & Education:  Please see above.  Patient is a great candidate for continued therapy via SNF or LTAC to improve her independence and safety with mobility and ADLs prior to returning home. Patient continues to require total assist for LB hygiene mgmt due to impaired standing balance. Vitals monitored and WFL.    Patient left up in chair with all lines intact, call button in reach, and nurse notified..    GOALS:   Multidisciplinary Problems       Physical Therapy Goals          Problem: Physical Therapy    Goal Priority Disciplines Outcome Goal Variances Interventions   Physical Therapy Goal     PT, PT/OT Progressing     Description: Goals to be met by: discharge     Patient will increase functional independence with mobility by performin. Bed to chair transfer with Modified [place] using Rolling Walker  2. Gait  x 150 feet with Modified [place] using Rolling Walker.                          Time Tracking:     PT Received On: 24  PT Start Time: 1122     PT Stop Time: 1139  PT Total Time (min): 17 min     Billable Minutes: Gait Training 0 and Therapeutic Activity 17    Treatment Type: Treatment  PT/PTA: PT           2024

## 2024-07-29 NOTE — PROGRESS NOTES
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 Patient is unable to give herself the injections as home health is only available every other day. So she comes back to hospital to see if she can be discharged to long term acute care during the duration of antibiotic use (a total of 6 weeks). Patient notes complaints of diarrhea that is likely an adverse effect of triple antibiotic regimen.     July 24, 2024-osteomyelitis was treated with IV daptomycin, IV Rocephin, no fever, no shortness for breath    July 25, 2024-complain of diarrhea 3 times last 24 hours, no nausea, no vomiting, no abdominal pain, no fever    July 26, 2024-no complaints, no nausea, no vomiting, no abdominal pain, waiting for skilled nursing facility     July 27 2024-the patient is doing well, no fever, no shortness for breath, waiting for skilled nursing facility    July 28, 2024-no complaints, no fever, no shortness for breath, waiting for skilled nursing facility    July 29, 2024-patient is doing well, no fever, no nausea, waiting for skilled nursing facility    Objective/physical exam:  General: In no acute distress, afebrile  Chest: Clear to auscultation bilaterally  Heart: RRR, +S1, S2, no appreciable murmur  Abdomen: Soft, nontender, BS +  MSK: Warm, no lower extremity edema, no clubbing or cyanosis  Neurologic: Alert and oriented x4, Cranial nerve II-XII intact, Strength 5/5 in all 4 extremities    VITAL SIGNS: 24 HRS MIN & MAX LAST   Temp  Min: 96.9 °F (36.1 °C)  Max: 97.7 °F (36.5 °C) 97.4 °F (36.3 °C)   BP  Min: 119/68  Max: 183/64 119/68   Pulse  Min: 60  Max: 78  78   Resp  Min: 18  Max: 18 18   SpO2  Min: 93 %  Max: 98 % 97 %     I have reviewed the following  labs:  Recent Labs   Lab 07/23/24 1705 07/24/24 0340 07/26/24  0336   WBC 8.30 6.73 10.48   RBC 3.81* 3.74* 3.96*   HGB 9.9* 9.6* 10.2*   HCT 31.9* 31.4* 33.2*   MCV 83.7 84.0 83.8   MCH 26.0* 25.7* 25.8*   MCHC 31.0* 30.6* 30.7*   RDW 16.6 16.4 16.8    270 257   MPV 9.1 9.3 9.2     Recent Labs   Lab 07/23/24 1705 07/24/24 0340 07/26/24  0336    140 139   K 4.3 4.2 4.8    107 108   CO2 23 25 24   BUN 29.0* 23.0* 22.0*   CREATININE 1.07* 0.83 0.75   CALCIUM 10.6* 10.2 9.2   ALBUMIN 3.3*  --   --    ALKPHOS 53  --   --    ALT 19  --   --    AST 29  --   --    BILITOT 0.2  --   --      Microbiology Results (last 7 days)       Procedure Component Value Units Date/Time    Blood Culture #1 **CANNOT BE ORDERED STAT** [[id number]]  (Normal) Collected: 07/23/24 1705    Order Status: Completed Specimen: Blood from Arm, Right Updated: 07/29/24 0800     Blood Culture No Growth at 5 days    Blood Culture #2 **CANNOT BE ORDERED STAT** [[id number]]  (Normal) Collected: 07/23/24 1705    Order Status: Completed Specimen: Blood from Arm, Right Updated: 07/29/24 0800     Blood Culture No Growth at 5 days    Clostridium Diff Toxin, A & B, EIA [[id number]]     Order Status: Canceled Specimen: Stool     Clostridium Diff Toxin, A & B, EIA [[id number]] Collected: 07/24/24 0144    Order Status: Canceled Specimen: Stool Updated: 07/24/24 0157             See below for Radiology    Assessment/Plan:  Jaw osteomyelitis   Diabetes  Hypertension   Diarrhea       Continue IV daptomycin, IV Rocephin, p.o. Flagyl   Consult  for skilled nursing facility for 6 weeks of IV antibiotic  Check CBC BMP in a.m.      Patient condition:  Stable/Fair/Guarded/ Serious/ Critical    Anticipated discharge and Disposition:         All diagnosis and differential diagnosis have been reviewed; assessment and plan has been documented; I have personally reviewed the labs and test results that are presently available; I have reviewed  the patients medication list; I have reviewed the consulting providers response and recommendations. I have reviewed or attempted to review medical records based upon their availability    All of the patient's questions have been  addressed and answered. Patient's is agreeable to the above stated plan. I will continue to monitor closely and make adjustments to medical management as needed.    Portions of this note dictated using EMR integrated voice recognition software, and may be subject to voice recognition errors not corrected at proofreading. Please contact writer for clarification if needed.   _____________________________________________________________________    Malnutrition Status:    Scheduled Med:   aspirin  81 mg Oral Daily    carvediloL  6.25 mg Oral BID    cefTRIAXone (Rocephin) IV (PEDS and ADULTS)  2 g Intravenous Q24H    DAPTOmycin (CUBICIN) IV (PEDS and ADULTS)  350 mg Intravenous Q24H    lactobacillus acidophilus & bulgar  1 packet Oral BID    metroNIDAZOLE  500 mg Oral Q8H      Continuous Infusions:     PRN Meds:    Current Facility-Administered Medications:     acetaminophen, 650 mg, Oral, Q6H PRN    dextrose 10%, 12.5 g, Intravenous, PRN    dextrose 10%, 25 g, Intravenous, PRN    glucagon (human recombinant), 1 mg, Intramuscular, PRN    glucose, 16 g, Oral, PRN    glucose, 24 g, Oral, PRN    hydrALAZINE, 10 mg, Intravenous, Q6H PRN    HYDROcodone-acetaminophen, 1 tablet, Oral, Q6H PRN    insulin aspart U-100, 0-5 Units, Subcutaneous, QID (AC + HS) PRN    melatonin, 6 mg, Oral, Nightly PRN    sodium chloride 0.9%, 10 mL, Intravenous, PRN     Radiology:  I have personally reviewed the following imaging and agree with the radiologist.     X-Ray Chest AP Portable  Narrative: EXAMINATION:  Chest one view    CLINICAL HISTORY:  PICC line placement    COMPARISON:  None    FINDINGS:  Left-sided intra PICC line tip projects over the right atrium.  There is no visible pneumothorax.  Lungs are clear.   Cardiac silhouette is normal in size.  There is no pleural effusion.  Impression: Left-sided intra PICC line tip projects over the right atrium.    No acute cardiopulmonary process.    Electronically signed by: Mukesh Rick MD  Date:    07/18/2024  Time:    17:40      Corey Allen MD  Department of Hospital Medicine   [facility]   07/29/2024

## 2024-07-30 VITALS
TEMPERATURE: 98 F | HEART RATE: 83 BPM | HEIGHT: 55 IN | WEIGHT: 104.5 LBS | SYSTOLIC BLOOD PRESSURE: 149 MMHG | RESPIRATION RATE: 18 BRPM | DIASTOLIC BLOOD PRESSURE: 66 MMHG | OXYGEN SATURATION: 99 % | BODY MASS INDEX: 24.18 KG/M2

## 2024-07-30 LAB
O+P STL MICRO: NORMAL
POCT GLUCOSE: 163 MG/DL (ref 70–110)
POCT GLUCOSE: 201 MG/DL (ref 70–110)

## 2024-07-30 PROCEDURE — 97116 GAIT TRAINING THERAPY: CPT

## 2024-07-30 PROCEDURE — 25000003 PHARM REV CODE 250: Performed by: INTERNAL MEDICINE

## 2024-07-30 PROCEDURE — 63600175 PHARM REV CODE 636 W HCPCS: Performed by: INTERNAL MEDICINE

## 2024-07-30 PROCEDURE — 94761 N-INVAS EAR/PLS OXIMETRY MLT: CPT

## 2024-07-30 RX ORDER — NAPROXEN SODIUM 220 MG/1
81 TABLET, FILM COATED ORAL DAILY
Start: 2024-07-31 | End: 2025-07-31

## 2024-07-30 RX ADMIN — METRONIDAZOLE 500 MG: 250 TABLET ORAL at 05:07

## 2024-07-30 RX ADMIN — CARVEDILOL 6.25 MG: 6.25 TABLET, FILM COATED ORAL at 09:07

## 2024-07-30 RX ADMIN — ASPIRIN 81 MG CHEWABLE TABLET 81 MG: 81 TABLET CHEWABLE at 09:07

## 2024-07-30 RX ADMIN — Medication 1 EACH: at 09:07

## 2024-07-30 RX ADMIN — INSULIN ASPART 2 UNITS: 100 INJECTION, SOLUTION INTRAVENOUS; SUBCUTANEOUS at 10:07

## 2024-07-30 NOTE — PT/OT/SLP PROGRESS
Physical Therapy Treatment    Patient Name:  Augusta James   MRN:  [id number]    Recommendations:     Discharge Recommendations: Moderate Intensity Therapy (Would greatly benefit from SNF)  Discharge Equipment Recommendations: none  Barriers to discharge:  medical condition    Assessment:     Augusta James is a 89 y.o. female admitted with a medical diagnosis of Osteomyelitis of jaw.  She presents with the following impairments/functional limitations: weakness, impaired endurance, impaired functional mobility, gait instability, impaired balance, impaired self care skills .    Rehab Prognosis: Good; patient would benefit from acute skilled PT services to address these deficits and reach maximum level of function.    Recent Surgery: * No surgery found *      Plan:     During this hospitalization, patient to be seen 6 x/week (1-2x daily as needed) to address the identified rehab impairments via gait training, therapeutic activities, therapeutic exercises and progress toward the following goals:    Plan of Care Expires:       Subjective     Chief Complaint: fatigue and weakness.  Patient/Family Comments/goals: she wants to go to rehab and then back home  Pain/Comfort:  Pain Rating 1: 0/10      Objective:     Communicated with nursg prior to session.  Patient found up in chair with telemetry, PureWick upon PT entry to room.     General Precautions: Standard, fall  Orthopedic Precautions: N/A  Braces: N/A  Respiratory Status: Room air     Functional Mobility:  Bed Mobility:     Scooting: minimum assistance  Supine to Sit: minimum assistance  Transfers:     Sit to Stand:  minimum assistance with rolling walker  Bed to Chair: minimum assistance with  rolling walker  using  Step Transfer  Gait: RW with SBA ~200ft      AM-PAC 6 CLICK MOBILITY          Treatment & Education:  See above. Patient is progressing slowly but steadily.    Patient left up in chair with all lines intact and call button in reach..    GOALS:    Multidisciplinary Problems       Physical Therapy Goals          Problem: Physical Therapy    Goal Priority Disciplines Outcome Goal Variances Interventions   Physical Therapy Goal     PT, PT/OT Progressing     Description: Goals to be met by: discharge     Patient will increase functional independence with mobility by performin. Bed to chair transfer with Modified [place] using Rolling Walker  2. Gait  x 150 feet with Modified [place] using Rolling Walker.                          Time Tracking:     PT Received On: 24  PT Start Time: 0900     PT Stop Time: 920  PT Total Time (min): 20 min     Billable Minutes: Gait Training 20 and Therapeutic Activity 0    Treatment Type: Treatment  PT/PTA: PT           2024

## 2024-07-30 NOTE — PLAN OF CARE
Rec'd [id number] from [facility].  Sent PSSR/142 to [facility].  Will see if MD is ready to d/c her today.  [facility] has SNF auth and can take today .

## 2024-07-30 NOTE — DISCHARGE SUMMARY
[facility]  Hospital Medicine  Discharge Summary      Patient Name: Augusta James  MRN: [id number]  Admission Date: 7/23/2024  Hospital Length of Stay: 7 days  Discharge Date and Time:  07/30/2024 12:28 PM  Attending Physician: Corey Allen MD   Discharging Provider: Naldo Dawn MD  Discharge Provider Team: Networked reference to record PCT   Primary Care Provider: Capo Kelly MD        HPI:   89year old female with history of HTN, HLD, DM II presenting from home with complaints of inability to self administer antibiotic therapy that she was discharged with after recent hospitalization 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 Patient is unable to give herself the injections as home health is only available every other day. So she comes back to hospital to see if she can be discharged to long term acute care during the duration of antibiotic use (a total of 6 weeks). Patient notes complaints of diarrhea that is likely an adverse effect of triple antibiotic regimen.          * No surgery found *      Hospital Course:   July 24, 2024-osteomyelitis was treated with IV daptomycin, IV Rocephin, no fever, no shortness for breath     July 25, 2024-complain of diarrhea 3 times last 24 hours, no nausea, no vomiting, no abdominal pain, no fever     July 26, 2024-no complaints, no nausea, no vomiting, no abdominal pain, waiting for skilled nursing facility      July 27 2024-the patient is doing well, no fever, no shortness for breath, waiting for skilled nursing facility     July 28, 2024-no complaints, no fever, no shortness for breath, waiting for skilled nursing facility     July 29, 2024-patient is doing well, no fever, no nausea, waiting for skilled nursing facility    7/30-she continues to do well; has been approved for SNF    ---    Jaw osteomyelitis   Diabetes  Hypertension   Diarrhea-resolved         Continue IV daptomycin, IV Rocephin, p.o. Flagyl   Resume routine medications  for treatment of diabetes and hypertension    Physical exam  Constitution-well nourished, normally developed female in NAD  HENT-normocephalic, atraumatic  Neck-supple  Respiratory-normal respirations  Heart-RRR  Abdomen-soft, nontender, nondistended  Musculoskeletal-no joint abnormalities, normal ROM throughout  Skin-warm, dry; no rashes  Neurologic-alert and oriented x3    Consults:     Final Active Diagnoses:    Diagnosis Date Noted POA    PRINCIPAL PROBLEM:  Osteomyelitis of jaw [M27.2] 07/23/2024 Yes    Diarrhea [R19.7] 07/23/2024 Yes    Pressure injury of skin of sacral region [L89.159] 07/23/2024 Yes      Problems Resolved During this Admission:      Discharged Condition: stable    Disposition: Skilled Nursing Facility    Follow Up:   Follow-up Information       Capo Kelly MD Follow up.    Specialty: Family Medicine  Why: Follow-up once discharged from SNF  Contact information:  [address]  [address]  [facility] [address]  [phone]                           Patient Instructions:      Diet diabetic     Activity as tolerated     Medications:  Reconciled Home Medications:      Medication List        START taking these medications      aspirin 81 MG Chew  Take 1 tablet (81 mg total) by mouth once daily.  Start taking on: July 31, 2024  Replaces: aspirin 81 mg Cap     D5W PgBk 100 mL with cefTRIAXone 2 gram SolR 2 g  Inject 2 g into the vein Daily.            CHANGE how you take these medications      0.9% NaCl SolP 50 mL with DAPTOmycin 500 mg SolR 350 mg  Inject 350 mg into the vein every 48 hours.  What changed: additional instructions            CONTINUE taking these medications      carvediloL 6.25 MG tablet  Commonly known as: COREG  Take 6.25 mg by mouth 2 (two) times daily.     dapagliflozin propanediol 5 mg Tab tablet  Commonly known as: Farxiga  Take 10 mg by mouth once daily.     EScitalopram oxalate 10 MG tablet  Commonly known as: LEXAPRO  Take 10 mg by mouth once daily.    "  hydroCHLOROthiazide 25 MG tablet  Commonly known as: HYDRODIURIL  Take 25 mg by mouth once daily.     lactobacillus acidophilus & bulgar 100 million cell packet  Commonly known as: LACTINEX  Take 1 packet (1 each total) by mouth 2 (two) times daily.     lovastatin 20 MG tablet  Commonly known as: MEVACOR  Take 20 mg by mouth every evening.     metFORMIN 500 MG ER 24hr tablet  Commonly known as: GLUCOPHAGE-XR  Take 500 mg by mouth 2 (two) times daily.     metroNIDAZOLE 500 MG tablet  Commonly known as: FLAGYL  Take 1 tablet (500 mg total) by mouth every 8 (eight) hours.            STOP taking these medications      alendronate 70 MG tablet  Commonly known as: FOSAMAX     aspirin 81 mg Cap  Replaced by: aspirin 81 MG Chew     cefTRIAXone 1 gram injection  Commonly known as: Rocephin              Significant Diagnostic Studies: Labs: CMP   Recent Labs   Lab 07/29/24  1021      K 4.5      CO2 27   BUN 17.0   CREATININE 0.80   CALCIUM 9.9    and CBC No results for input(s): "WBC", "HGB", "HCT", "PLT" in the last 48 hours.    Radiology:   CXR  Impression:  Left-sided intra PICC line tip projects over the right atrium.    Pending Diagnostic Studies:       None          Indwelling Lines/Drains at time of discharge:   Lines/Drains/Airways       Peripherally Inserted Central Catheter Line  Duration             PICC Double Lumen left basilic -- days              Drain  Duration             Female External Urinary Catheter w/ Suction 07/23/24 2015 6 days                  Patient screened for social drivers of health:  Housing instability  Transportation needs  Utility difficulties  Interpersonal safety  ---no needs identified    Time spent on the discharge of patient: 41 minutes         Naldo Dawn MD  Department of Hospital Medicine  [facility]      "

## 2024-07-30 NOTE — PLAN OF CARE
07/30/24 1302   Final Note   Assessment Type Final Discharge Note   Anticipated Discharge Disposition SNF   Post-Acute Status   Home Health Status Set-up Complete/Auth obtained   IV Infusion Status Set-up Complete/Auth obtained   Discharge Delays None known at this time     D/c info sent to [facility] for pt to d/c to SNF on [facility].  She can go by [facility].  Nurse will call report and d/c pt.

## 2024-09-09 ENCOUNTER — LAB REQUISITION (OUTPATIENT)
Dept: LAB | Facility: HOSPITAL | Age: 89
End: 2024-09-09
Payer: COMMERCIAL

## 2024-09-09 DIAGNOSIS — R41.0 DISORIENTATION, UNSPECIFIED: ICD-10-CM

## 2024-09-09 LAB
ALBUMIN SERPL-MCNC: 3.9 G/DL (ref 3.4–4.8)
ALBUMIN/GLOB SERPL: 0.9 RATIO (ref 1.1–2)
ALP SERPL-CCNC: 173 UNIT/L (ref 40–150)
ALT SERPL-CCNC: 93 UNIT/L (ref 0–55)
ANION GAP SERPL CALC-SCNC: 9 MEQ/L
AST SERPL-CCNC: 24 UNIT/L (ref 5–34)
BACTERIA #/AREA URNS AUTO: ABNORMAL /HPF
BASOPHILS # BLD AUTO: 0.06 X10(3)/MCL
BASOPHILS NFR BLD AUTO: 0.8 %
BILIRUB SERPL-MCNC: 0.4 MG/DL
BILIRUB UR QL STRIP.AUTO: NEGATIVE
BUN SERPL-MCNC: 20 MG/DL (ref 9.8–20.1)
CALCIUM SERPL-MCNC: 10.1 MG/DL (ref 8.4–10.2)
CHLORIDE SERPL-SCNC: 105 MMOL/L (ref 98–111)
CLARITY UR: CLEAR
CO2 SERPL-SCNC: 23 MMOL/L (ref 23–31)
COLOR UR AUTO: YELLOW
CREAT SERPL-MCNC: 0.96 MG/DL (ref 0.55–1.02)
CREAT/UREA NIT SERPL: 21
EOSINOPHIL # BLD AUTO: 0.06 X10(3)/MCL (ref 0–0.9)
EOSINOPHIL NFR BLD AUTO: 0.8 %
ERYTHROCYTE [DISTWIDTH] IN BLOOD BY AUTOMATED COUNT: 16.4 % (ref 11.5–17)
GFR SERPLBLD CREATININE-BSD FMLA CKD-EPI: 56 ML/MIN/1.73/M2
GLOBULIN SER-MCNC: 4.4 GM/DL (ref 2.4–3.5)
GLUCOSE SERPL-MCNC: 180 MG/DL (ref 75–121)
GLUCOSE UR QL STRIP: ABNORMAL
HCT VFR BLD AUTO: 32.9 % (ref 37–47)
HGB BLD-MCNC: 10.5 G/DL (ref 12–16)
HGB UR QL STRIP: ABNORMAL
IMM GRANULOCYTES # BLD AUTO: 0.01 X10(3)/MCL (ref 0–0.04)
IMM GRANULOCYTES NFR BLD AUTO: 0.1 %
KETONES UR QL STRIP: NEGATIVE
LEUKOCYTE ESTERASE UR QL STRIP: NEGATIVE
LYMPHOCYTES # BLD AUTO: 4.82 X10(3)/MCL (ref 0.6–4.6)
LYMPHOCYTES NFR BLD AUTO: 62.2 %
MCH RBC QN AUTO: 26.3 PG (ref 27–31)
MCHC RBC AUTO-ENTMCNC: 31.9 G/DL (ref 33–36)
MCV RBC AUTO: 82.3 FL (ref 80–94)
MONOCYTES # BLD AUTO: 0.51 X10(3)/MCL (ref 0.1–1.3)
MONOCYTES NFR BLD AUTO: 6.6 %
NEUTROPHILS # BLD AUTO: 2.29 X10(3)/MCL (ref 2.1–9.2)
NEUTROPHILS NFR BLD AUTO: 29.5 %
NITRITE UR QL STRIP: NEGATIVE
PH UR STRIP: 5.5 [PH]
PLATELET # BLD AUTO: 307 X10(3)/MCL (ref 130–400)
PMV BLD AUTO: 9.6 FL (ref 7.4–10.4)
POTASSIUM SERPL-SCNC: 4.6 MMOL/L (ref 3.5–5.1)
PROT SERPL-MCNC: 8.3 GM/DL (ref 5.8–7.6)
PROT UR QL STRIP: ABNORMAL
RBC # BLD AUTO: 4 X10(6)/MCL (ref 4.2–5.4)
RBC #/AREA URNS AUTO: ABNORMAL /HPF
SODIUM SERPL-SCNC: 137 MMOL/L (ref 136–145)
SP GR UR STRIP.AUTO: 1.01 (ref 1–1.03)
SQUAMOUS #/AREA URNS AUTO: ABNORMAL /HPF
UROBILINOGEN UR STRIP-ACNC: 0.2
WBC # BLD AUTO: 7.75 X10(3)/MCL (ref 4.5–11.5)
WBC #/AREA URNS AUTO: ABNORMAL /HPF
YEAST UR QL AUTO: ABNORMAL /HPF

## 2024-09-09 PROCEDURE — 85025 COMPLETE CBC W/AUTO DIFF WBC: CPT | Performed by: NURSE PRACTITIONER

## 2024-09-09 PROCEDURE — 81003 URINALYSIS AUTO W/O SCOPE: CPT | Performed by: NURSE PRACTITIONER

## 2024-09-09 PROCEDURE — 80053 COMPREHEN METABOLIC PANEL: CPT | Performed by: NURSE PRACTITIONER
